# Patient Record
Sex: FEMALE | Race: WHITE | NOT HISPANIC OR LATINO | Employment: UNEMPLOYED | URBAN - NONMETROPOLITAN AREA
[De-identification: names, ages, dates, MRNs, and addresses within clinical notes are randomized per-mention and may not be internally consistent; named-entity substitution may affect disease eponyms.]

---

## 2017-01-05 ENCOUNTER — OFFICE VISIT (OUTPATIENT)
Dept: PAIN MEDICINE | Facility: CLINIC | Age: 57
End: 2017-01-05

## 2017-01-05 VITALS
SYSTOLIC BLOOD PRESSURE: 124 MMHG | BODY MASS INDEX: 20.58 KG/M2 | DIASTOLIC BLOOD PRESSURE: 68 MMHG | WEIGHT: 109 LBS | HEIGHT: 61 IN

## 2017-01-05 DIAGNOSIS — Z79.899 HIGH RISK MEDICATIONS (NOT ANTICOAGULANTS) LONG-TERM USE: ICD-10-CM

## 2017-01-05 DIAGNOSIS — M51.36 DDD (DEGENERATIVE DISC DISEASE), LUMBAR: Primary | ICD-10-CM

## 2017-01-05 DIAGNOSIS — M47.817 LUMBOSACRAL SPONDYLOSIS WITHOUT MYELOPATHY: ICD-10-CM

## 2017-01-05 PROCEDURE — 99214 OFFICE O/P EST MOD 30 MIN: CPT | Performed by: PAIN MEDICINE

## 2017-01-05 RX ORDER — OXYCODONE AND ACETAMINOPHEN 10; 325 MG/1; MG/1
1 TABLET ORAL 4 TIMES DAILY
Qty: 120 TABLET | Refills: 0 | Status: SHIPPED | OUTPATIENT
Start: 2017-01-05 | End: 2017-02-04

## 2017-01-05 NOTE — PROGRESS NOTES
"Hollie Pedro is a 56 y.o. female.   1960    HPI:   Location: lower back and bilateral hip  Quality: sharp  Severity: 7/10  Timing: constant  Alleviating: pain medication  Aggravating: increased activity     Pt had skin lesion removed from ear in interim.  Continues opioid medication for back and hip pains.  Continues to provide enough relief that she remains active with limitation.      The following portions of the patient's history were reviewed by me and updated as appropriate: allergies, current medications, past family history, past medical history, past social history, past surgical history and problem list.    Past Medical History   Diagnosis Date   • Acute bronchitis    • Adenomatous polyp of colon    • Arthropathy of lumbar facet joint    • Asthma    • Benign essential hypertension    • Chronic obstructive lung disease    • Chronic pain    • Degeneration of lumbar intervertebral disc    • Degenerative joint disease involving multiple joints    • Depressive disorder    • Drug therapy      Long-term drug therapy   • Drug therapy      Other long term (current) drug therapy   • Emphysema/COPD      \"Emphysema\"   • Encounter for gynecological examination      Gynecologic examination   • Encounter for gynecological examination without abnormal finding      Encounter for gynecological examination (general) (routine) without abnormal findings   • Encounter for screening for malignant neoplasm of colon      Screening for malignant neoplasm of colon   • Epigastric pain    • Gastroesophageal reflux disease    • Generalized anxiety disorder    • History of bone density study 10/17/2014     DEXA BONE DENSITY 61257 ([facility]) (2) - SAMANTHA MAGANA ([facility])    • History of diagnostic mammography 04/28/2016     DIAG MAMM, UNILAT RT DIG  (Medicare) ([facility]) (1) - ZAYRA JENKINS ([facility])    • History of mammogram      Other screening mammogram   • History of mammogram 10/29/2015     MAMMOGRAM UNILATERAL RT 82523 ([facility]) (1) - B. " "IZZY ([facility])    • History of mammogram 10/26/2015     SCREENING MAMMOGRAM 63014 ([facility]) (4) - B. IZZY ([facility])    • Hyperlipidemia    • Hypertensive disorder    • Left flank pain    • Lumbosacral radiculitis    • Menopause      Menopause - controlled on prempro   • Mild recurrent major depression    • Myofascial pain    • Osteoarthritis    • Osteopenia    • Osteoporosis    • Pain in lower limb    • Paresthesia    • Pleurisy    • Prolapse of vaginal walls without uterine prolapse    • Proximal muscle weakness    • Rash      C/O: a rash - legs, arms, and abdomen.   • Smoker    • Syncope    • Transient cerebral ischemia    • Unexplained weight loss      Unexplained weight loss - No obvious GI reason   • Weakness of face muscles        Social History     Social History   • Marital status: Single     Spouse name: N/A   • Number of children: N/A   • Years of education: N/A     Occupational History   • Not on file.     Social History Main Topics   • Smoking status: Current Every Day Smoker     Types: Cigarettes, Electronic Cigarette   • Smokeless tobacco: Not on file      Comment: Interested in quitting smoking; Amount: 1-9 cigs/day  using the E cigarette. She is smoking 6-8 per day   • Alcohol use No   • Drug use: No   • Sexual activity: Defer      Comment: Marital status: Single     Other Topics Concern   • Not on file     Social History Narrative       Family History   Problem Relation Age of Onset   • Diabetes Mother    • Heart disease Mother    • Hypertension Mother    • Clotting disorder Mother      \"blood clots in lungs and leg after surgery\"   • Lung cancer Father    • Heart disease Sister    • Hypertension Sister    • Heart disease Brother    • Hypertension Brother    • Breast cancer Neg Hx    • Colon cancer Neg Hx      Colorectal cancer   • Endometrial cancer Neg Hx    • Ovarian cancer Neg Hx          Current Outpatient Prescriptions:   •  albuterol (PROVENTIL) (2.5 MG/3ML) 0.083% nebulizer solution, Take 2.5 " mg by nebulization 4 (Four) Times a Day. Use 1 Vial, Disp: 120 mL, Rfl: 5  •  ALPRAZolam (XANAX) 1 MG tablet, Take 1 tablet by mouth 2 (Two) Times a Day. Take 0.5 mg in am and 1 mg at bedtime prn, Disp: 30 tablet, Rfl: 1  •  amitriptyline (ELAVIL) 150 MG tablet, Take 150 mg by mouth every night. at bedtime, Disp: , Rfl:   •  aspirin 81 MG chewable tablet, Chew 81 mg daily., Disp: , Rfl:   •  atorvastatin (LIPITOR) 40 MG tablet, Take 40 mg by mouth daily., Disp: , Rfl:   •  budesonide-formoterol (SYMBICORT) 160-4.5 MCG/ACT inhaler, Inhale 1 puff 2 (Two) Times a Day., Disp: 6 g, Rfl: 5  •  buPROPion SR (WELLBUTRIN SR) 150 MG 12 hr tablet, Take 150 mg by mouth 2 (Two) Times a Day., Disp: , Rfl:   •  Calcium Carbonate-Vit D-Min (CALTRATE PLUS PO), Take 1 tablet by mouth 2 (two) times a day., Disp: , Rfl:   •  cetirizine (zyrTEC) 10 MG tablet, , Disp: , Rfl: 3  •  diltiazem La (CARDIZEM LA) 420 MG 24 hr tablet, 420 mg daily. PER DR. MARTINEZ; Med Name: diltiazem  mg tablet,extended release 24 hr, Disp: , Rfl:   •  Docusate Calcium (STOOL SOFTENER PO), Take 1 tablet by mouth as needed., Disp: , Rfl:   •  fluticasone (FLONASE) 50 MCG/ACT nasal spray, 1-2 sprays into each nostril Daily., Disp: 1 each, Rfl: 5  •  furosemide (LASIX) 20 MG tablet, Take 1 tablet by mouth Daily., Disp: 30 tablet, Rfl: 5  •  ipratropium (ATROVENT HFA) 17 MCG/ACT inhaler, Inhale 2 puffs 4 (Four) Times a Day As Needed for wheezing., Disp: 12.9 g, Rfl: 5  •  ondansetron (ZOFRAN) 4 MG tablet, Take 4 mg by mouth Every 8 (Eight) Hours As Needed for nausea or vomiting., Disp: , Rfl:   •  oxyCODONE-acetaminophen (PERCOCET)  MG per tablet, Take 1 tablet by mouth Every 6 (Six) Hours As Needed for moderate pain (4-6)., Disp: , Rfl:   •  oxyCODONE-acetaminophen (PERCOCET)  MG per tablet, Take 1 tablet by mouth 4 (Four) Times a Day for 30 days., Disp: 120 tablet, Rfl: 0  •  oxyCODONE-acetaminophen (PERCOCET)  MG per tablet, Take 1 tablet  "by mouth 4 (Four) Times a Day for 30 days., Disp: 120 tablet, Rfl: 0  •  PARoxetine (PAXIL) 20 MG tablet, Take 20 mg by mouth Every Night., Disp: , Rfl:   •  ranitidine (ZANTAC) 300 MG tablet, Take 300 mg by mouth 2 (two) times a day., Disp: , Rfl:   •  valACYclovir (VALTREX) 500 MG tablet, Take 500 mg by mouth 2 (two) times a day., Disp: , Rfl:   •  vitamin D (ERGOCALCIFEROL) 00717 UNITS capsule capsule, Take 1 capsule by mouth Every 14 (Fourteen) Days., Disp: 2 capsule, Rfl: 5    Allergies   Allergen Reactions   • Ditropan Xl [Oxybutynin] Other (See Comments)     Other reaction(s): YEAST   • Gabapentin    • Ibuprofen Nausea And Vomiting   • Naprosyn [Naproxen] Nausea And Vomiting   • Phenergan [Promethazine] Hallucinations   • Plavix [Clopidogrel]    • Prednisone Hives   • Prilosec [Omeprazole] Nausea And Vomiting   • Tylenol [Acetaminophen] Nausea And Vomiting     Tylenol-Codeine #3   • Valium [Diazepam] Other (See Comments)     Other reaction(s): \"winds me up\"   • Carafate [Sucralfate] Rash   • Celebrex [Celecoxib] Rash   • Cephalexin Rash   • Doxycycline Monohydrate [Doxycycline] Rash and Other (See Comments)     Other reaction(s): SICK   • Other      Cipro:  Rash  Darvocet-N 100:  N/V   • Relafen [Nabumetone] Nausea And Vomiting and Rash         Review of Systems   Musculoskeletal: Back pain: lower.        Bilateral hip     10 system review of systems was reviewed and negative except for above.    Physical Exam   Constitutional: She appears well-developed and well-nourished. No distress.   Musculoskeletal:        Lumbar back: She exhibits decreased range of motion (min ext with facet loading ) and pain.   Neurological: She is alert.   Walker for ambulation   Psychiatric: She has a normal mood and affect. Her behavior is normal. Judgment normal.       Hollie was seen today for back pain and hip pain.    Diagnoses and all orders for this visit:    DDD (degenerative disc disease), lumbar    Lumbosacral spondylosis " without myelopathy    High risk medications (not anticoagulants) long-term use    Other orders  -     oxyCODONE-acetaminophen (PERCOCET)  MG per tablet; Take 1 tablet by mouth 4 (Four) Times a Day for 30 days.  -     oxyCODONE-acetaminophen (PERCOCET)  MG per tablet; Take 1 tablet by mouth 4 (Four) Times a Day for 30 days.        Medication: Patient reports no negative side effects, Patient reports appropriate usage and storage habits, Patient's opioid provides enough reflief to be more active and perform activities of daily living with less discomfort. and Refill opioid medication as above    Interventional: none at this time    Rehab: none at this time    Behavioral: No aberrant behavior noted. [facility] Report #[id number]  was reviewed and is consistent with stated history    Urine drug screen Reviewed from last visit and is inappropriate.  Soma present.  States she has a prescription for this she takes only when she has significant spasms.  This is from an orthopedist.  I let her know not to take any more soma.          This document has been electronically signed by Kyle Her MD on January 5, 2017 10:46 AM

## 2017-01-09 ENCOUNTER — TELEPHONE (OUTPATIENT)
Dept: FAMILY MEDICINE CLINIC | Facility: CLINIC | Age: 57
End: 2017-01-09

## 2017-01-09 RX ORDER — PAROXETINE HYDROCHLORIDE 20 MG/1
20 TABLET, FILM COATED ORAL NIGHTLY
Qty: 30 TABLET | Refills: 5 | Status: SHIPPED | OUTPATIENT
Start: 2017-01-09 | End: 2017-02-27

## 2017-01-09 RX ORDER — AMITRIPTYLINE HYDROCHLORIDE 150 MG/1
150 TABLET, FILM COATED ORAL NIGHTLY
Qty: 30 TABLET | Refills: 5 | Status: SHIPPED | OUTPATIENT
Start: 2017-01-09 | End: 2017-02-27

## 2017-01-09 RX ORDER — RANITIDINE 300 MG/1
300 TABLET ORAL 2 TIMES DAILY
Qty: 60 TABLET | Refills: 5 | Status: SHIPPED | OUTPATIENT
Start: 2017-01-09 | End: 2017-04-11 | Stop reason: SDUPTHER

## 2017-01-09 NOTE — TELEPHONE ENCOUNTER
----- Message from Florence Farah sent at 1/9/2017  2:59 PM CST -----  PATIENT NEEDS REFILL ON  PAXIL  AMITRIPTYLINE  ZANTAC   SENT TO CLINIC PHARM

## 2017-01-20 DIAGNOSIS — Z12.31 VISIT FOR SCREENING MAMMOGRAM: Primary | ICD-10-CM

## 2017-02-27 ENCOUNTER — APPOINTMENT (OUTPATIENT)
Dept: LAB | Facility: HOSPITAL | Age: 57
End: 2017-02-27

## 2017-02-27 ENCOUNTER — OFFICE VISIT (OUTPATIENT)
Dept: PAIN MEDICINE | Facility: CLINIC | Age: 57
End: 2017-02-27

## 2017-02-27 VITALS
DIASTOLIC BLOOD PRESSURE: 80 MMHG | HEIGHT: 60 IN | SYSTOLIC BLOOD PRESSURE: 140 MMHG | BODY MASS INDEX: 21.85 KG/M2 | WEIGHT: 111.3 LBS

## 2017-02-27 DIAGNOSIS — M51.36 DDD (DEGENERATIVE DISC DISEASE), LUMBAR: Primary | ICD-10-CM

## 2017-02-27 DIAGNOSIS — M47.817 LUMBOSACRAL SPONDYLOSIS WITHOUT MYELOPATHY: ICD-10-CM

## 2017-02-27 DIAGNOSIS — Z79.899 HIGH RISK MEDICATIONS (NOT ANTICOAGULANTS) LONG-TERM USE: ICD-10-CM

## 2017-02-27 PROCEDURE — 80307 DRUG TEST PRSMV CHEM ANLYZR: CPT | Performed by: PAIN MEDICINE

## 2017-02-27 PROCEDURE — 99214 OFFICE O/P EST MOD 30 MIN: CPT | Performed by: NURSE PRACTITIONER

## 2017-02-27 PROCEDURE — G0481 DRUG TEST DEF 8-14 CLASSES: HCPCS | Performed by: PAIN MEDICINE

## 2017-02-27 RX ORDER — BENZONATATE 100 MG/1
CAPSULE ORAL
COMMUNITY
Start: 2016-11-28 | End: 2018-03-15

## 2017-02-27 RX ORDER — OXYCODONE AND ACETAMINOPHEN 10; 325 MG/1; MG/1
1 TABLET ORAL 4 TIMES DAILY
Qty: 120 TABLET | Refills: 0 | Status: SHIPPED | OUTPATIENT
Start: 2017-02-27 | End: 2017-03-29

## 2017-02-27 RX ORDER — AMITRIPTYLINE HYDROCHLORIDE 150 MG/1
150 TABLET, FILM COATED ORAL
COMMUNITY
End: 2017-02-27

## 2017-02-27 RX ORDER — OXYCODONE AND ACETAMINOPHEN 10; 325 MG/1; MG/1
1 TABLET ORAL 4 TIMES DAILY
COMMUNITY

## 2017-02-27 RX ORDER — NITROGLYCERIN 0.4 MG/1
0.4 TABLET SUBLINGUAL
COMMUNITY
Start: 2016-11-04 | End: 2018-06-12

## 2017-02-27 RX ORDER — FLUTICASONE PROPIONATE 50 MCG
1 SPRAY, SUSPENSION (ML) NASAL
COMMUNITY
End: 2017-02-27

## 2017-02-27 RX ORDER — VALACYCLOVIR HYDROCHLORIDE 500 MG/1
500 TABLET, FILM COATED ORAL
COMMUNITY
End: 2017-02-27

## 2017-02-27 RX ORDER — PAROXETINE HYDROCHLORIDE 20 MG/1
20 TABLET, FILM COATED ORAL
COMMUNITY
End: 2017-04-11 | Stop reason: SDUPTHER

## 2017-02-27 RX ORDER — BUPROPION HYDROCHLORIDE 150 MG/1
150 TABLET, EXTENDED RELEASE ORAL DAILY
COMMUNITY
Start: 2016-10-27

## 2017-02-27 RX ORDER — ALPRAZOLAM 2 MG/1
2 TABLET ORAL
COMMUNITY
End: 2017-04-11 | Stop reason: ALTCHOICE

## 2017-02-27 RX ORDER — RANITIDINE 300 MG/1
300 TABLET ORAL
COMMUNITY
End: 2017-04-11 | Stop reason: SDUPTHER

## 2017-02-27 RX ORDER — ONDANSETRON 4 MG/1
4 TABLET, FILM COATED ORAL
COMMUNITY
End: 2017-02-27

## 2017-02-27 RX ORDER — OLANZAPINE 5 MG/1
TABLET ORAL
Refills: 5 | COMMUNITY
Start: 2017-02-06 | End: 2017-02-27

## 2017-02-27 RX ORDER — ATORVASTATIN CALCIUM 40 MG/1
40 TABLET, FILM COATED ORAL
COMMUNITY
Start: 2016-11-04 | End: 2017-02-27

## 2017-02-27 RX ORDER — DILTIAZEM HYDROCHLORIDE 420 MG/1
CAPSULE, EXTENDED RELEASE ORAL
Refills: 11 | COMMUNITY
Start: 2017-02-06 | End: 2017-04-11 | Stop reason: SDUPTHER

## 2017-02-27 NOTE — PROGRESS NOTES
"Hollie Pedro is a 57 y.o. female.   1960    HPI:   Location: lower back and bilateral hip  Quality: sharp  Severity: 7/10  Timing: constant  Alleviating: pain medication  Aggravating: increased activity     Patient denies side effects, she reports that her opioid medication still provides significant relief and allows her to be more active.  No changes in health status.       The following portions of the patient's history were reviewed by me and updated as appropriate: allergies, current medications, past family history, past medical history, past social history, past surgical history and problem list.    Past Medical History   Diagnosis Date   • Acute bronchitis    • Adenomatous polyp of colon    • Arthropathy of lumbar facet joint    • Asthma    • Benign essential hypertension    • Chronic obstructive lung disease    • Chronic pain    • Degeneration of lumbar intervertebral disc    • Degenerative joint disease involving multiple joints    • Depressive disorder    • Drug therapy      Long-term drug therapy   • Drug therapy      Other long term (current) drug therapy   • Emphysema/COPD      \"Emphysema\"   • Encounter for gynecological examination      Gynecologic examination   • Encounter for gynecological examination without abnormal finding      Encounter for gynecological examination (general) (routine) without abnormal findings   • Encounter for screening for malignant neoplasm of colon      Screening for malignant neoplasm of colon   • Epigastric pain    • Gastroesophageal reflux disease    • Generalized anxiety disorder    • History of bone density study 10/17/2014     DEXA BONE DENSITY 28506 ([facility]) (2) - SAMANTHA MAGANA ([facility])    • History of diagnostic mammography 04/28/2016     DIAG MAMM, UNILAT RT DIG  (Medicare) ([facility]) (1) - ZAYRA JENKINS ([facility]1)    • History of mammogram      Other screening mammogram   • History of mammogram 10/29/2015     MAMMOGRAM UNILATERAL RT 01378 ([facility]) (1) - SAMANTHA MAGANA ([facility])  " "  • History of mammogram 10/26/2015     SCREENING MAMMOGRAM 22448 ([facility]) (4) - SAMANTHA MAGANA ([facility])    • Hyperlipidemia    • Hypertensive disorder    • Left flank pain    • Lumbosacral radiculitis    • Menopause      Menopause - controlled on prempro   • Mild recurrent major depression    • Myofascial pain    • Osteoarthritis    • Osteopenia    • Osteoporosis    • Pain in lower limb    • Paresthesia    • Pleurisy    • Prolapse of vaginal walls without uterine prolapse    • Proximal muscle weakness    • Rash      C/O: a rash - legs, arms, and abdomen.   • Smoker    • Syncope    • Transient cerebral ischemia    • Unexplained weight loss      Unexplained weight loss - No obvious GI reason   • Weakness of face muscles        Social History     Social History   • Marital status: Single     Spouse name: N/A   • Number of children: N/A   • Years of education: N/A     Occupational History   • Not on file.     Social History Main Topics   • Smoking status: Current Every Day Smoker     Types: Cigarettes, Electronic Cigarette   • Smokeless tobacco: Not on file      Comment: Interested in quitting smoking; Amount: 1-9 cigs/day  using the E cigarette. She is smoking 6-8 per day   • Alcohol use No   • Drug use: No   • Sexual activity: Defer      Comment: Marital status: Single     Other Topics Concern   • Not on file     Social History Narrative       Family History   Problem Relation Age of Onset   • Diabetes Mother    • Heart disease Mother    • Hypertension Mother    • Clotting disorder Mother      \"blood clots in lungs and leg after surgery\"   • Lung cancer Father    • Heart disease Sister    • Hypertension Sister    • Heart disease Brother    • Hypertension Brother    • Breast cancer Neg Hx    • Colon cancer Neg Hx      Colorectal cancer   • Endometrial cancer Neg Hx    • Ovarian cancer Neg Hx          Current Outpatient Prescriptions:   •  albuterol (PROVENTIL) (2.5 MG/3ML) 0.083% nebulizer solution, Take 2.5 mg by " nebulization 4 (Four) Times a Day. Use 1 Vial, Disp: 120 mL, Rfl: 5  •  ALPRAZolam (XANAX) 1 MG tablet, Take 1 tablet by mouth 2 (Two) Times a Day. Take 0.5 mg in am and 1 mg at bedtime prn, Disp: 30 tablet, Rfl: 1  •  aspirin 81 MG chewable tablet, Chew 81 mg daily., Disp: , Rfl:   •  atorvastatin (LIPITOR) 40 MG tablet, Take 40 mg by mouth daily., Disp: , Rfl:   •  budesonide-formoterol (SYMBICORT) 160-4.5 MCG/ACT inhaler, Inhale 1 puff 2 (Two) Times a Day., Disp: 6 g, Rfl: 5  •  buPROPion SR (WELLBUTRIN SR) 150 MG 12 hr tablet, Take 1 tablet by mouth., Disp: , Rfl:   •  Calcium Carbonate-Vit D-Min (CALTRATE PLUS PO), Take 1 tablet by mouth 2 (two) times a day., Disp: , Rfl:   •  cetirizine (zyrTEC) 10 MG tablet, , Disp: , Rfl: 3  •  diltiazem La (CARDIZEM LA) 420 MG 24 hr tablet, 420 mg daily. PER DR. MARTINEZ; Med Name: diltiazem  mg tablet,extended release 24 hr, Disp: , Rfl:   •  Docusate Calcium (STOOL SOFTENER PO), Take 1 tablet by mouth as needed., Disp: , Rfl:   •  fluticasone (FLONASE) 50 MCG/ACT nasal spray, 1-2 sprays into each nostril Daily., Disp: 1 each, Rfl: 5  •  furosemide (LASIX) 20 MG tablet, Take 1 tablet by mouth Daily., Disp: 30 tablet, Rfl: 5  •  ipratropium (ATROVENT HFA) 17 MCG/ACT inhaler, Inhale 2 puffs 4 (Four) Times a Day As Needed for wheezing., Disp: 12.9 g, Rfl: 5  •  nitroglycerin (NITROSTAT) 0.4 MG SL tablet, Place 0.4 mg under the tongue Every 5 (Five) Minutes., Disp: , Rfl:   •  ondansetron (ZOFRAN) 4 MG tablet, Take 4 mg by mouth Every 8 (Eight) Hours As Needed for nausea or vomiting., Disp: , Rfl:   •  oxyCODONE-acetaminophen (PERCOCET)  MG per tablet, Take 1 tablet by mouth Every 6 (Six) Hours As Needed for moderate pain (4-6)., Disp: , Rfl:   •  raNITIdine (ZANTAC) 300 MG tablet, Take 1 tablet by mouth 2 (Two) Times a Day., Disp: 60 tablet, Rfl: 5  •  valACYclovir (VALTREX) 500 MG tablet, Take 500 mg by mouth 2 (two) times a day., Disp: , Rfl:   •  vitamin D  "(ERGOCALCIFEROL) 93219 UNITS capsule capsule, Take 1 capsule by mouth Every 14 (Fourteen) Days., Disp: 2 capsule, Rfl: 5  •  ALPRAZolam (XANAX) 2 MG tablet, Take 2 mg by mouth., Disp: , Rfl:   •  benzonatate (TESSALON) 100 MG capsule, , Disp: , Rfl:   •  Cholecalciferol 5000 UNITS tablet, Take 5,000 Units by mouth., Disp: , Rfl:   •  diltiaZEM (TIAZAC) 420 MG 24 hr capsule, , Disp: , Rfl: 11  •  fluticasone-salmeterol (ADVAIR DISKUS) 250-50 MCG/DOSE DISKUS, Inhale 1 puff Every 12 (Twelve) Hours., Disp: , Rfl:   •  ipratropium (ATROVENT HFA) 17 MCG/ACT inhaler, Inhale 2 puffs 4 (Four) Times a Day., Disp: , Rfl:   •  oxyCODONE-acetaminophen (PERCOCET)  MG per tablet, Take 1 tablet by mouth 4 (Four) Times a Day., Disp: , Rfl:   •  oxyCODONE-acetaminophen (PERCOCET)  MG per tablet, Take 1 tablet by mouth 4 (Four) Times a Day for 30 days., Disp: 120 tablet, Rfl: 0  •  oxyCODONE-acetaminophen (PERCOCET)  MG per tablet, Take 1 tablet by mouth 4 (Four) Times a Day for 30 days., Disp: 120 tablet, Rfl: 0  •  PARoxetine (PAXIL) 20 MG tablet, Take 20 mg by mouth., Disp: , Rfl:   •  raNITIdine (ZANTAC) 300 MG tablet, Take 300 mg by mouth., Disp: , Rfl:     Allergies   Allergen Reactions   • Ditropan Xl [Oxybutynin] Other (See Comments)     Other reaction(s): YEAST   • Gabapentin    • Ibuprofen Nausea And Vomiting   • Naprosyn [Naproxen] Nausea And Vomiting   • Olanzapine    • Phenergan [Promethazine] Hallucinations   • Plavix [Clopidogrel]    • Prednisone Hives   • Prilosec [Omeprazole] Nausea And Vomiting   • Tylenol [Acetaminophen] Nausea And Vomiting     Tylenol-Codeine #3   • Valium [Diazepam] Other (See Comments)     Other reaction(s): \"winds me up\"   • Carafate [Sucralfate] Rash   • Celebrex [Celecoxib] Rash   • Cephalexin Rash   • Doxycycline Monohydrate [Doxycycline] Rash and Other (See Comments)     Other reaction(s): SICK   • Other      Cipro:  Rash  Darvocet-N 100:  N/V   • Relafen [Nabumetone] " Nausea And Vomiting and Rash         Review of Systems  10 system review of systems was reviewed and negative except for above.    Physical Exam   Constitutional: She appears well-developed and well-nourished. No distress.   Musculoskeletal:        Lumbar back: She exhibits decreased range of motion (min ext with facet loading .  Tested while seated) and pain.   Neurological: She is alert.   Walker for ambulation   Psychiatric: She has a normal mood and affect. Her behavior is normal. Judgment normal.       Hollie was seen today for back pain and pain.    Diagnoses and all orders for this visit:    DDD (degenerative disc disease), lumbar  -     ToxASSURE Select 13 (MW)    Lumbosacral spondylosis without myelopathy  -     ToxASSURE Select 13 (MW)    High risk medications (not anticoagulants) long-term use  -     ToxASSURE Select 13 (MW)    Other orders  -     oxyCODONE-acetaminophen (PERCOCET)  MG per tablet; Take 1 tablet by mouth 4 (Four) Times a Day for 30 days.  -     oxyCODONE-acetaminophen (PERCOCET)  MG per tablet; Take 1 tablet by mouth 4 (Four) Times a Day for 30 days.        Medication: Patient reports no negative side effects, Patient reports appropriate usage and storage habits, Patient's opioid provides enough reflief to be more active and perform activities of daily living with less discomfort. and Refill opioid medication as above    Interventional: none at this time    Rehab: none at this time    Behavioral: No aberrant behavior noted. [facility] Report #[id number]  was reviewed and is consistent with stated history    Urine drug screen Ordered today to test for drugs of abuse and prescribed medications          This document has been electronically signed by Kyle Her MD on February 27, 2017 9:35 AM

## 2017-03-03 LAB
CONV REPORT SUMMARY: NORMAL
Lab: NORMAL

## 2017-03-15 ENCOUNTER — LAB (OUTPATIENT)
Dept: LAB | Facility: OTHER | Age: 57
End: 2017-03-15

## 2017-03-15 DIAGNOSIS — R74.8 ELEVATED LIVER ENZYMES: ICD-10-CM

## 2017-03-15 DIAGNOSIS — J44.9 CHRONIC OBSTRUCTIVE PULMONARY DISEASE, UNSPECIFIED COPD TYPE (HCC): ICD-10-CM

## 2017-03-15 LAB
ALBUMIN SERPL-MCNC: 4.4 G/DL (ref 3.2–5.5)
ALBUMIN/GLOB SERPL: 1.5 G/DL (ref 1–3)
ALP SERPL-CCNC: 167 U/L (ref 15–121)
ALT SERPL W P-5'-P-CCNC: 28 U/L (ref 10–60)
ANION GAP SERPL CALCULATED.3IONS-SCNC: 12 MMOL/L (ref 5–15)
AST SERPL-CCNC: 34 U/L (ref 10–60)
BILIRUB CONJ SERPL-MCNC: <0.1 MG/DL (ref 0–0.1)
BILIRUB SERPL-MCNC: 0.7 MG/DL (ref 0.2–1)
BUN BLD-MCNC: 6 MG/DL (ref 8–25)
BUN/CREAT SERPL: 6.7 (ref 7–25)
CALCIUM SPEC-SCNC: 10.1 MG/DL (ref 8.4–10.8)
CHLORIDE SERPL-SCNC: 98 MMOL/L (ref 100–112)
CO2 SERPL-SCNC: 30 MMOL/L (ref 20–32)
CREAT BLD-MCNC: 0.9 MG/DL (ref 0.4–1.3)
GFR SERPL CREATININE-BSD FRML MDRD: 64 ML/MIN/1.73 (ref 51–120)
GLOBULIN UR ELPH-MCNC: 3 GM/DL (ref 2.5–4.6)
GLUCOSE BLD-MCNC: 105 MG/DL (ref 70–100)
POTASSIUM BLD-SCNC: 2.7 MMOL/L (ref 3.4–5.4)
PROT SERPL-MCNC: 7.4 G/DL (ref 6.7–8.2)
SODIUM BLD-SCNC: 140 MMOL/L (ref 134–146)

## 2017-03-15 PROCEDURE — 36415 COLL VENOUS BLD VENIPUNCTURE: CPT | Performed by: INTERNAL MEDICINE

## 2017-03-15 PROCEDURE — 80053 COMPREHEN METABOLIC PANEL: CPT | Performed by: INTERNAL MEDICINE

## 2017-03-15 PROCEDURE — 80074 ACUTE HEPATITIS PANEL: CPT | Performed by: INTERNAL MEDICINE

## 2017-03-15 PROCEDURE — 82248 BILIRUBIN DIRECT: CPT | Performed by: INTERNAL MEDICINE

## 2017-03-17 LAB
HAV IGM SERPL QL IA: NEGATIVE
HBV CORE IGM SERPL QL IA: NEGATIVE
HBV SURFACE AG SERPL QL IA: NEGATIVE
HCV AB SER DONR QL: NEGATIVE

## 2017-04-11 ENCOUNTER — OFFICE VISIT (OUTPATIENT)
Dept: FAMILY MEDICINE CLINIC | Facility: CLINIC | Age: 57
End: 2017-04-11

## 2017-04-11 ENCOUNTER — LAB (OUTPATIENT)
Dept: LAB | Facility: OTHER | Age: 57
End: 2017-04-11

## 2017-04-11 VITALS
SYSTOLIC BLOOD PRESSURE: 134 MMHG | HEIGHT: 61 IN | HEART RATE: 115 BPM | OXYGEN SATURATION: 96 % | BODY MASS INDEX: 21.14 KG/M2 | WEIGHT: 112 LBS | DIASTOLIC BLOOD PRESSURE: 68 MMHG

## 2017-04-11 DIAGNOSIS — J44.9 CHRONIC OBSTRUCTIVE PULMONARY DISEASE, UNSPECIFIED COPD TYPE (HCC): Chronic | ICD-10-CM

## 2017-04-11 DIAGNOSIS — M15.9 OSTEOARTHRITIS OF MULTIPLE JOINTS, UNSPECIFIED OSTEOARTHRITIS TYPE: Chronic | ICD-10-CM

## 2017-04-11 DIAGNOSIS — E78.5 HYPERLIPIDEMIA, UNSPECIFIED HYPERLIPIDEMIA TYPE: Chronic | ICD-10-CM

## 2017-04-11 DIAGNOSIS — E87.6 HYPOKALEMIA: ICD-10-CM

## 2017-04-11 DIAGNOSIS — F41.9 ANXIETY: Primary | Chronic | ICD-10-CM

## 2017-04-11 DIAGNOSIS — F32.A DEPRESSION, UNSPECIFIED DEPRESSION TYPE: Chronic | ICD-10-CM

## 2017-04-11 DIAGNOSIS — K21.00 REFLUX ESOPHAGITIS: Chronic | ICD-10-CM

## 2017-04-11 LAB — POTASSIUM BLD-SCNC: 4.1 MMOL/L (ref 3.4–5.4)

## 2017-04-11 PROCEDURE — 84132 ASSAY OF SERUM POTASSIUM: CPT | Performed by: INTERNAL MEDICINE

## 2017-04-11 PROCEDURE — 36415 COLL VENOUS BLD VENIPUNCTURE: CPT | Performed by: INTERNAL MEDICINE

## 2017-04-11 PROCEDURE — 99214 OFFICE O/P EST MOD 30 MIN: CPT | Performed by: INTERNAL MEDICINE

## 2017-04-11 RX ORDER — BUDESONIDE AND FORMOTEROL FUMARATE DIHYDRATE 160; 4.5 UG/1; UG/1
1 AEROSOL RESPIRATORY (INHALATION) 2 TIMES DAILY
Qty: 6 G | Refills: 5 | Status: SHIPPED | OUTPATIENT
Start: 2017-04-11 | End: 2017-04-11 | Stop reason: SDUPTHER

## 2017-04-11 RX ORDER — BUDESONIDE AND FORMOTEROL FUMARATE DIHYDRATE 160; 4.5 UG/1; UG/1
1 AEROSOL RESPIRATORY (INHALATION) 2 TIMES DAILY
Qty: 6 G | Refills: 5 | Status: SHIPPED | OUTPATIENT
Start: 2017-04-11 | End: 2018-03-15

## 2017-04-11 RX ORDER — FLUTICASONE PROPIONATE 50 MCG
1-2 SPRAY, SUSPENSION (ML) NASAL DAILY
Qty: 1 EACH | Refills: 5 | Status: SHIPPED | OUTPATIENT
Start: 2017-04-11 | End: 2017-04-11 | Stop reason: SDUPTHER

## 2017-04-11 RX ORDER — DILTIAZEM HYDROCHLORIDE EXTENDED-RELEASE TABLETS 420 MG/1
420 TABLET, EXTENDED RELEASE ORAL DAILY
Qty: 30 TABLET | Refills: 5 | Status: SHIPPED | OUTPATIENT
Start: 2017-04-11 | End: 2018-05-05 | Stop reason: SDUPTHER

## 2017-04-11 RX ORDER — ALPRAZOLAM 1 MG/1
1 TABLET ORAL 3 TIMES DAILY
COMMUNITY
End: 2017-08-23 | Stop reason: ALTCHOICE

## 2017-04-11 RX ORDER — RANITIDINE 300 MG/1
300 TABLET ORAL 2 TIMES DAILY
Qty: 60 TABLET | Refills: 5 | Status: SHIPPED | OUTPATIENT
Start: 2017-04-11 | End: 2018-01-08 | Stop reason: SDUPTHER

## 2017-04-11 RX ORDER — CETIRIZINE HYDROCHLORIDE 10 MG/1
10 TABLET ORAL DAILY
Qty: 30 TABLET | Refills: 5 | Status: SHIPPED | OUTPATIENT
Start: 2017-04-11

## 2017-04-11 RX ORDER — ERGOCALCIFEROL 1.25 MG/1
50000 CAPSULE ORAL
Qty: 2 CAPSULE | Refills: 5 | Status: SHIPPED | OUTPATIENT
Start: 2017-04-11 | End: 2017-04-11 | Stop reason: SDUPTHER

## 2017-04-11 RX ORDER — ERGOCALCIFEROL 1.25 MG/1
50000 CAPSULE ORAL
Qty: 2 CAPSULE | Refills: 5 | Status: SHIPPED | OUTPATIENT
Start: 2017-04-11 | End: 2018-06-12

## 2017-04-11 RX ORDER — PAROXETINE HYDROCHLORIDE 20 MG/1
20 TABLET, FILM COATED ORAL EVERY MORNING
Qty: 30 TABLET | Refills: 5 | Status: SHIPPED | OUTPATIENT
Start: 2017-04-11 | End: 2018-06-12

## 2017-04-11 RX ORDER — ATORVASTATIN CALCIUM 40 MG/1
40 TABLET, FILM COATED ORAL DAILY
Qty: 30 TABLET | Refills: 5 | Status: SHIPPED | OUTPATIENT
Start: 2017-04-11 | End: 2018-05-05 | Stop reason: SDUPTHER

## 2017-04-11 RX ORDER — FLUTICASONE PROPIONATE 50 MCG
1-2 SPRAY, SUSPENSION (ML) NASAL DAILY
Qty: 1 EACH | Refills: 5 | Status: SHIPPED | OUTPATIENT
Start: 2017-04-11 | End: 2018-06-12

## 2017-04-11 RX ORDER — VALACYCLOVIR HYDROCHLORIDE 500 MG/1
500 TABLET, FILM COATED ORAL 2 TIMES DAILY
Qty: 60 TABLET | Refills: 1 | Status: SHIPPED | OUTPATIENT
Start: 2017-04-11

## 2017-04-11 NOTE — PROGRESS NOTES
Subjective   Hollie Pedro is a 57 y.o. female.     Chief Complaint   Patient presents with   • Follow-up     6 month check up med refill        History of Present Illness     Pt in f/u on anxiety, DJD, hyperlipidemia, depression, reflux, and COPD.    Pt says she is a nervous wreck because she is worried about her lab work.  She admits that she was taken off a chewable calcium and she wants to know what her calcium level looks like.  She says she knows her heart is beating fast today but only because she is so nervous.  Pt is otherwise doing okay and tolerating her medication and other conditions well.     The following portions of the patient's history were reviewed and updated as appropriate: allergies, current medications, past family history, past medical history, past social history, past surgical history and problem list.    Review of Systems   Constitutional: Negative for activity change, appetite change, fatigue and unexpected weight change.   HENT: Negative for ear pain, facial swelling and hearing loss.    Respiratory: Negative for cough, chest tightness, shortness of breath and wheezing.    Cardiovascular: Negative for chest pain, palpitations and leg swelling.   Gastrointestinal: Negative for abdominal pain.   Genitourinary: Negative for difficulty urinating, dysuria, flank pain, frequency and urgency.   Musculoskeletal: Positive for arthralgias.   Skin: Negative for color change and rash.   Allergic/Immunologic: Negative for environmental allergies and food allergies.   Neurological: Negative for dizziness, syncope, weakness, numbness and headaches.   Hematological: Negative for adenopathy.   Psychiatric/Behavioral: Negative for confusion, decreased concentration, dysphoric mood, sleep disturbance and suicidal ideas. The patient is nervous/anxious.    All other systems reviewed and are negative.      Objective   Physical Exam   Constitutional: She is oriented to person, place, and time. Vital signs  are normal. She appears well-developed and well-nourished.   HENT:   Head: Normocephalic.   Right Ear: External ear normal.   Left Ear: External ear normal.   Nose: Nose normal.   Mouth/Throat: Oropharynx is clear and moist.   Eyes: Conjunctivae and EOM are normal. Pupils are equal, round, and reactive to light.   Neck: Normal range of motion. Neck supple. No JVD present. No thyromegaly present.   Cardiovascular: Normal rate, regular rhythm, normal heart sounds and intact distal pulses.    No murmur heard.  Pulmonary/Chest: Effort normal and breath sounds normal. No respiratory distress. She has no wheezes. She has no rales. She exhibits no tenderness.   Abdominal: Soft. Bowel sounds are normal. She exhibits no distension and no mass. There is no tenderness. There is no rebound and no guarding. No hernia.   Musculoskeletal: Normal range of motion. She exhibits no edema or deformity.   Lymphadenopathy:     She has no cervical adenopathy.   Neurological: She is alert and oriented to person, place, and time. No cranial nerve deficit. Coordination normal.   Skin: Skin is warm and dry. No rash noted. No pallor.   Psychiatric: Her behavior is normal. Judgment and thought content normal. Her mood appears anxious. She does not exhibit a depressed mood. She expresses no homicidal and no suicidal ideation. She expresses no suicidal plans and no homicidal plans.   Nursing note and vitals reviewed.      Assessment/Plan   Hollie was seen today for follow-up.    Diagnoses and all orders for this visit:    Anxiety    Osteoarthritis of multiple joints, unspecified osteoarthritis type    Hyperlipidemia, unspecified hyperlipidemia type    Depression, unspecified depression type    Reflux esophagitis    Chronic obstructive pulmonary disease, unspecified COPD type    Hypokalemia  Comments:  New  Orders:  -     Potassium; Future    Other orders  -     atorvastatin (LIPITOR) 40 MG tablet; Take 1 tablet by mouth Daily.  -     Discontinue:  budesonide-formoterol (SYMBICORT) 160-4.5 MCG/ACT inhaler; Inhale 1 puff 2 (Two) Times a Day.  -     cetirizine (zyrTEC) 10 MG tablet; Take 1 tablet by mouth Daily.  -     Cholecalciferol 5000 UNITS tablet; Take 5,000 Units by mouth Daily.  -     diltiazem La (CARDIZEM LA) 420 MG 24 hr tablet; Take 1 tablet by mouth Daily. PER DR. MARTINEZ; Med Name: diltiazem  mg tablet,extended release 24 hr  -     Discontinue: fluticasone (FLONASE) 50 MCG/ACT nasal spray; 1-2 sprays into each nostril Daily.  -     fluticasone-salmeterol (ADVAIR DISKUS) 250-50 MCG/DOSE DISKUS; Inhale 1 puff Every 12 (Twelve) Hours.  -     ipratropium (ATROVENT HFA) 17 MCG/ACT inhaler; Inhale 2 puffs 4 (Four) Times a Day.  -     PARoxetine (PAXIL) 20 MG tablet; Take 1 tablet by mouth Every Morning.  -     raNITIdine (ZANTAC) 300 MG tablet; Take 1 tablet by mouth 2 (Two) Times a Day.  -     valACYclovir (VALTREX) 500 MG tablet; Take 1 tablet by mouth 2 (Two) Times a Day.  -     Discontinue: vitamin D (ERGOCALCIFEROL) 69730 UNITS capsule capsule; Take 1 capsule by mouth Every 14 (Fourteen) Days.  -     fluticasone (FLONASE) 50 MCG/ACT nasal spray; 1-2 sprays into each nostril Daily.  -     budesonide-formoterol (SYMBICORT) 160-4.5 MCG/ACT inhaler; Inhale 1 puff 2 (Two) Times a Day.  -     vitamin D (ERGOCALCIFEROL) 91521 UNITS capsule capsule; Take 1 capsule by mouth Every 14 (Fourteen) Days.         STAT on the way out    Potassium level    Scrib[facility] for Dr. Falcon by [facility] 04[facility]11/17      Reviewed labs with patient from 3-15-17  Glucose 105  Creatinine 0.90  BUN 6  Sodium 140  Potassium 2.7  Calcium 10.1  Hemoglobin 13.7   Hematocrit  40.6  Platelets   323  Hepatitis screen is negative    U/S of Abdomen from 12-22-16 shows cholecystectomy.  Atherosclerotic vascular abdominal aortic disease.  No evidence of acute intra abdominal disease.    Recommended to the patient to quit smoking.  Explained risks of continuing, and  benefits of quitting.       Refill medications if due    Patient refills Xanax with Kadie Monroe.  Patient refills Percocet with Kyle Her    Scribed for Dr. Falcon by Lory Zendejas [facility] 04/11/17

## 2017-04-27 ENCOUNTER — OFFICE VISIT (OUTPATIENT)
Dept: PAIN MEDICINE | Facility: CLINIC | Age: 57
End: 2017-04-27

## 2017-04-27 VITALS
HEIGHT: 61 IN | WEIGHT: 111.7 LBS | BODY MASS INDEX: 21.09 KG/M2 | SYSTOLIC BLOOD PRESSURE: 130 MMHG | DIASTOLIC BLOOD PRESSURE: 68 MMHG

## 2017-04-27 DIAGNOSIS — Z79.899 HIGH RISK MEDICATIONS (NOT ANTICOAGULANTS) LONG-TERM USE: ICD-10-CM

## 2017-04-27 DIAGNOSIS — M47.817 LUMBOSACRAL SPONDYLOSIS WITHOUT MYELOPATHY: ICD-10-CM

## 2017-04-27 DIAGNOSIS — M51.36 DDD (DEGENERATIVE DISC DISEASE), LUMBAR: Primary | ICD-10-CM

## 2017-04-27 PROCEDURE — 99214 OFFICE O/P EST MOD 30 MIN: CPT | Performed by: PAIN MEDICINE

## 2017-04-27 RX ORDER — OXYCODONE AND ACETAMINOPHEN 10; 325 MG/1; MG/1
1 TABLET ORAL 4 TIMES DAILY
Qty: 120 TABLET | Refills: 0 | Status: SHIPPED | OUTPATIENT
Start: 2017-04-27 | End: 2017-05-27

## 2017-04-27 RX ORDER — AMITRIPTYLINE HYDROCHLORIDE 150 MG/1
150 TABLET, FILM COATED ORAL NIGHTLY
COMMUNITY

## 2017-04-27 RX ORDER — ASPIRIN 81 MG/1
TABLET, COATED ORAL
Refills: 12 | COMMUNITY
Start: 2017-04-20 | End: 2017-12-15 | Stop reason: SDUPTHER

## 2017-04-27 NOTE — PROGRESS NOTES
"Hollie Pedro is a 57 y.o. female.   1960    HPI:   Location: lower back and bilateral hip  Quality: sharp  Severity: 6-7/10  Timing: constant  Alleviating: pain medication  Aggravating: increased activity     Patient reports that the opioid medication still provides her good relief and allows increased activity than she would have without the opioid medication.  She denies side effects.          The following portions of the patient's history were reviewed by me and updated as appropriate: allergies, current medications, past family history, past medical history, past social history, past surgical history and problem list.    Past Medical History:   Diagnosis Date   • Acute bronchitis    • Adenomatous polyp of colon    • Arthropathy of lumbar facet joint    • Asthma    • Benign essential hypertension    • Chronic obstructive lung disease    • Chronic pain    • Degeneration of lumbar intervertebral disc    • Degenerative joint disease involving multiple joints    • Depressive disorder    • Drug therapy     Long-term drug therapy   • Drug therapy     Other long term (current) drug therapy   • Emphysema/COPD     \"Emphysema\"   • Encounter for gynecological examination     Gynecologic examination   • Encounter for gynecological examination without abnormal finding     Encounter for gynecological examination (general) (routine) without abnormal findings   • Encounter for screening for malignant neoplasm of colon     Screening for malignant neoplasm of colon   • Epigastric pain    • Gastroesophageal reflux disease    • Generalized anxiety disorder    • History of bone density study 10/17/2014    DEXA BONE DENSITY 71640 ([facility]) (2) - SAMANTHA MAGANA ([facility])    • History of diagnostic mammography 04/28/2016    DIAG MAMM, UNILAT RT DIG  (Medicare) ([facility]) (1) - ZAYRA JENKINS ([facility])    • History of mammogram     Other screening mammogram   • History of mammogram 10/29/2015    MAMMOGRAM UNILATERAL RT 27283 ([facility]) (1) - B. " "IZZY ([facility])    • History of mammogram 10/26/2015    SCREENING MAMMOGRAM 72195 ([facility]) (4) - B. IZZY ([facility])    • Hyperlipidemia    • Hypertensive disorder    • Left flank pain    • Lumbosacral radiculitis    • Menopause     Menopause - controlled on prempro   • Mild recurrent major depression    • Myofascial pain    • Osteoarthritis    • Osteopenia    • Osteoporosis    • Pain in lower limb    • Paresthesia    • Pleurisy    • Prolapse of vaginal walls without uterine prolapse    • Proximal muscle weakness    • Rash     C/O: a rash - legs, arms, and abdomen.   • Smoker    • Syncope    • Transient cerebral ischemia    • Unexplained weight loss     Unexplained weight loss - No obvious GI reason   • Weakness of face muscles        Social History     Social History   • Marital status: Single     Spouse name: N/A   • Number of children: N/A   • Years of education: N/A     Occupational History   • Not on file.     Social History Main Topics   • Smoking status: Current Every Day Smoker     Types: Cigarettes, Electronic Cigarette   • Smokeless tobacco: Not on file      Comment: Interested in quitting smoking; Amount: 1-9 cigs/day  using the E cigarette. She is smoking 6-8 per day   • Alcohol use No   • Drug use: No   • Sexual activity: Defer      Comment: Marital status: Single     Other Topics Concern   • Not on file     Social History Narrative       Family History   Problem Relation Age of Onset   • Diabetes Mother    • Heart disease Mother    • Hypertension Mother    • Clotting disorder Mother      \"blood clots in lungs and leg after surgery\"   • Lung cancer Father    • Heart disease Sister    • Hypertension Sister    • Heart disease Brother    • Hypertension Brother    • Breast cancer Neg Hx    • Colon cancer Neg Hx      Colorectal cancer   • Endometrial cancer Neg Hx    • Ovarian cancer Neg Hx          Current Outpatient Prescriptions:   •  albuterol (PROVENTIL) (2.5 MG/3ML) 0.083% nebulizer solution, Take 2.5 mg " by nebulization 4 (Four) Times a Day. Use 1 Vial, Disp: 120 mL, Rfl: 5  •  ALPRAZolam (XANAX) 1 MG tablet, Take 1 mg by mouth 3 (Three) Times a Day., Disp: , Rfl:   •  amitriptyline (ELAVIL) 150 MG tablet, Take 150 mg by mouth., Disp: , Rfl:   •  aspirin 81 MG chewable tablet, Chew 81 mg daily., Disp: , Rfl:   •  ASPIRIN LOW DOSE 81 MG EC tablet, , Disp: , Rfl: 12  •  atorvastatin (LIPITOR) 40 MG tablet, Take 1 tablet by mouth Daily., Disp: 30 tablet, Rfl: 5  •  benzonatate (TESSALON) 100 MG capsule, , Disp: , Rfl:   •  budesonide-formoterol (SYMBICORT) 160-4.5 MCG/ACT inhaler, Inhale 1 puff 2 (Two) Times a Day., Disp: 6 g, Rfl: 5  •  buPROPion SR (WELLBUTRIN SR) 150 MG 12 hr tablet, Take 1 tablet by mouth., Disp: , Rfl:   •  Calcium Carbonate-Vit D-Min (CALTRATE PLUS PO), Take 1 tablet by mouth 2 (two) times a day., Disp: , Rfl:   •  cetirizine (zyrTEC) 10 MG tablet, Take 1 tablet by mouth Daily., Disp: 30 tablet, Rfl: 5  •  Cholecalciferol 5000 UNITS tablet, Take 5,000 Units by mouth Daily., Disp: 30 tablet, Rfl: 5  •  diltiazem La (CARDIZEM LA) 420 MG 24 hr tablet, Take 1 tablet by mouth Daily. PER DR. MARTINEZ; Med Name: diltiazem  mg tablet,extended release 24 hr, Disp: 30 tablet, Rfl: 5  •  Docusate Calcium (STOOL SOFTENER PO), Take 1 tablet by mouth as needed., Disp: , Rfl:   •  fluticasone (FLONASE) 50 MCG/ACT nasal spray, 1-2 sprays into each nostril Daily., Disp: 1 each, Rfl: 5  •  fluticasone-salmeterol (ADVAIR DISKUS) 250-50 MCG/DOSE DISKUS, Inhale 1 puff Every 12 (Twelve) Hours., Disp: 60 each, Rfl: 5  •  furosemide (LASIX) 20 MG tablet, Take 1 tablet by mouth Daily., Disp: 30 tablet, Rfl: 5  •  ipratropium (ATROVENT HFA) 17 MCG/ACT inhaler, Inhale 2 puffs 4 (Four) Times a Day., Disp: 1 inhaler, Rfl: 5  •  nitroglycerin (NITROSTAT) 0.4 MG SL tablet, Place 0.4 mg under the tongue Every 5 (Five) Minutes., Disp: , Rfl:   •  ondansetron (ZOFRAN) 4 MG tablet, Take 4 mg by mouth Every 8 (Eight) Hours As  "Needed for nausea or vomiting., Disp: , Rfl:   •  oxyCODONE-acetaminophen (PERCOCET)  MG per tablet, Take 1 tablet by mouth 4 (Four) Times a Day., Disp: , Rfl:   •  PARoxetine (PAXIL) 20 MG tablet, Take 1 tablet by mouth Every Morning., Disp: 30 tablet, Rfl: 5  •  raNITIdine (ZANTAC) 300 MG tablet, Take 1 tablet by mouth 2 (Two) Times a Day., Disp: 60 tablet, Rfl: 5  •  valACYclovir (VALTREX) 500 MG tablet, Take 1 tablet by mouth 2 (Two) Times a Day., Disp: 60 tablet, Rfl: 1  •  vitamin D (ERGOCALCIFEROL) 91806 UNITS capsule capsule, Take 1 capsule by mouth Every 14 (Fourteen) Days., Disp: 2 capsule, Rfl: 5  •  oxyCODONE-acetaminophen (PERCOCET)  MG per tablet, Take 1 tablet by mouth 4 (Four) Times a Day for 30 days., Disp: 120 tablet, Rfl: 0  •  oxyCODONE-acetaminophen (PERCOCET)  MG per tablet, Take 1 tablet by mouth 4 (Four) Times a Day for 30 days., Disp: 120 tablet, Rfl: 0    Allergies   Allergen Reactions   • Ciprofloxacin    • Ditropan Xl [Oxybutynin] Other (See Comments)     Other reaction(s): YEAST   • Gabapentin    • Ibuprofen Nausea And Vomiting   • Naprosyn [Naproxen] Nausea And Vomiting   • Olanzapine    • Phenergan [Promethazine] Hallucinations   • Plavix [Clopidogrel]    • Prednisone Hives   • Prilosec [Omeprazole] Nausea And Vomiting   • Tylenol [Acetaminophen] Nausea And Vomiting     Tylenol-Codeine #3   • Valium [Diazepam] Other (See Comments)     Other reaction(s): \"winds me up\"   • Carafate [Sucralfate] Rash   • Celebrex [Celecoxib] Rash   • Cephalexin Rash   • Doxycycline Monohydrate [Doxycycline] Rash and Other (See Comments)     Other reaction(s): SICK   • Other      Cipro:  Rash  Darvocet-N 100:  N/V   • Relafen [Nabumetone] Nausea And Vomiting and Rash         Review of Systems   Musculoskeletal: Positive for back pain (lower).        Bilateral hip pain     10 system review of systems was reviewed and negative except for above.    Physical Exam   Constitutional: She appears " well-developed and well-nourished. No distress.   Musculoskeletal:        Lumbar back: She exhibits decreased range of motion (ext limited to less than 5 deg. ).   Neurological: She is alert.   Walker for ambulation   Psychiatric: She has a normal mood and affect. Her behavior is normal. Judgment normal.       Hollie was seen today for back pain and hip pain.    Diagnoses and all orders for this visit:    DDD (degenerative disc disease), lumbar    Lumbosacral spondylosis without myelopathy    High risk medications (not anticoagulants) long-term use    Other orders  -     oxyCODONE-acetaminophen (PERCOCET)  MG per tablet; Take 1 tablet by mouth 4 (Four) Times a Day for 30 days.  -     oxyCODONE-acetaminophen (PERCOCET)  MG per tablet; Take 1 tablet by mouth 4 (Four) Times a Day for 30 days.      Medication: Patient reports no negative side effects, Patient reports appropriate usage and storage habits, Patient's opioid provides enough reflief to be more active and perform activities of daily living with less discomfort. and Refill opioid medication as above.  Denies any problems with her opioid medication.  Doing well.    Interventional: none at this time    Rehab: none at this time    Behavioral: No aberrant behavior noted. SHREYA Report #[id number]  was reviewed and is consistent with stated history    Urine drug screen Reviewed from last visit and is appropriate          This document has been electronically signed by Kyle Her MD on April 27, 2017 9:34 AM

## 2017-05-25 ENCOUNTER — TELEPHONE (OUTPATIENT)
Dept: FAMILY MEDICINE CLINIC | Facility: CLINIC | Age: 57
End: 2017-05-25

## 2017-06-09 ENCOUNTER — LAB (OUTPATIENT)
Dept: LAB | Facility: OTHER | Age: 57
End: 2017-06-09

## 2017-06-09 ENCOUNTER — TRANSCRIBE ORDERS (OUTPATIENT)
Dept: GENERAL RADIOLOGY | Facility: CLINIC | Age: 57
End: 2017-06-09

## 2017-06-09 DIAGNOSIS — Z13.220 LIPID SCREENING: Primary | ICD-10-CM

## 2017-06-09 DIAGNOSIS — Z13.1 DIABETES MELLITUS SCREENING: ICD-10-CM

## 2017-06-09 DIAGNOSIS — R53.1 WEAKNESS: ICD-10-CM

## 2017-06-09 DIAGNOSIS — Z79.899 LONG-TERM USE OF HIGH-RISK MEDICATION: ICD-10-CM

## 2017-06-09 DIAGNOSIS — Z13.220 LIPID SCREENING: ICD-10-CM

## 2017-06-09 LAB
25(OH)D3 SERPL-MCNC: 37.9 NG/ML (ref 30–100)
ALBUMIN SERPL-MCNC: 4.4 G/DL (ref 3.2–5.5)
ALBUMIN/GLOB SERPL: 1.5 G/DL (ref 1–3)
ALP SERPL-CCNC: 138 U/L (ref 15–121)
ALT SERPL W P-5'-P-CCNC: 23 U/L (ref 10–60)
ANION GAP SERPL CALCULATED.3IONS-SCNC: 12 MMOL/L (ref 5–15)
AST SERPL-CCNC: 31 U/L (ref 10–60)
BASOPHILS # BLD AUTO: 0.05 10*3/MM3 (ref 0–0.2)
BASOPHILS NFR BLD AUTO: 0.5 % (ref 0–2)
BILIRUB SERPL-MCNC: 0.5 MG/DL (ref 0.2–1)
BUN BLD-MCNC: 9 MG/DL (ref 8–25)
BUN/CREAT SERPL: 10 (ref 7–25)
CALCIUM SPEC-SCNC: 10.1 MG/DL (ref 8.4–10.8)
CHLORIDE SERPL-SCNC: 100 MMOL/L (ref 100–112)
CHOLEST SERPL-MCNC: 153 MG/DL (ref 150–200)
CO2 SERPL-SCNC: 27 MMOL/L (ref 20–32)
CREAT BLD-MCNC: 0.9 MG/DL (ref 0.4–1.3)
DEPRECATED RDW RBC AUTO: 51.8 FL (ref 36.4–46.3)
EOSINOPHIL # BLD AUTO: 0.18 10*3/MM3 (ref 0–0.7)
EOSINOPHIL NFR BLD AUTO: 2 % (ref 0–7)
ERYTHROCYTE [DISTWIDTH] IN BLOOD BY AUTOMATED COUNT: 16 % (ref 11.5–14.5)
GFR SERPL CREATININE-BSD FRML MDRD: 64 ML/MIN/1.73 (ref 51–120)
GLOBULIN UR ELPH-MCNC: 2.9 GM/DL (ref 2.5–4.6)
GLUCOSE BLD-MCNC: 103 MG/DL (ref 70–100)
HBA1C MFR BLD: 5.98 % (ref 4–5.6)
HCT VFR BLD AUTO: 42.1 % (ref 35–45)
HDLC SERPL-MCNC: 76 MG/DL (ref 35–100)
HGB BLD-MCNC: 14.4 G/DL (ref 12–15.5)
LDLC SERPL CALC-MCNC: 68 MG/DL
LDLC/HDLC SERPL: 0.9 {RATIO}
LYMPHOCYTES # BLD AUTO: 2.88 10*3/MM3 (ref 0.6–4.2)
LYMPHOCYTES NFR BLD AUTO: 31.2 % (ref 10–50)
MCH RBC QN AUTO: 31.1 PG (ref 26.5–34)
MCHC RBC AUTO-ENTMCNC: 34.2 G/DL (ref 31.4–36)
MCV RBC AUTO: 90.9 FL (ref 80–98)
MONOCYTES # BLD AUTO: 0.85 10*3/MM3 (ref 0–0.9)
MONOCYTES NFR BLD AUTO: 9.2 % (ref 0–12)
NEUTROPHILS # BLD AUTO: 5.27 10*3/MM3 (ref 2–8.6)
NEUTROPHILS NFR BLD AUTO: 57.1 % (ref 37–80)
PLATELET # BLD AUTO: 344 10*3/MM3 (ref 150–450)
PMV BLD AUTO: 9.9 FL (ref 8–12)
POTASSIUM BLD-SCNC: 4.1 MMOL/L (ref 3.4–5.4)
PROT SERPL-MCNC: 7.3 G/DL (ref 6.7–8.2)
RBC # BLD AUTO: 4.63 10*6/MM3 (ref 3.77–5.16)
SODIUM BLD-SCNC: 139 MMOL/L (ref 134–146)
TRIGL SERPL-MCNC: 43 MG/DL (ref 35–160)
TSH SERPL DL<=0.05 MIU/L-ACNC: 0.47 MIU/ML (ref 0.46–4.68)
VLDLC SERPL-MCNC: 8.6 MG/DL
WBC NRBC COR # BLD: 9.23 10*3/MM3 (ref 3.2–9.8)

## 2017-06-09 PROCEDURE — 84443 ASSAY THYROID STIM HORMONE: CPT | Performed by: INTERNAL MEDICINE

## 2017-06-09 PROCEDURE — 85025 COMPLETE CBC W/AUTO DIFF WBC: CPT | Performed by: INTERNAL MEDICINE

## 2017-06-09 PROCEDURE — 82306 VITAMIN D 25 HYDROXY: CPT | Performed by: INTERNAL MEDICINE

## 2017-06-09 PROCEDURE — 83036 HEMOGLOBIN GLYCOSYLATED A1C: CPT | Performed by: INTERNAL MEDICINE

## 2017-06-09 PROCEDURE — 80061 LIPID PANEL: CPT | Performed by: INTERNAL MEDICINE

## 2017-06-09 PROCEDURE — 80053 COMPREHEN METABOLIC PANEL: CPT | Performed by: INTERNAL MEDICINE

## 2017-06-27 ENCOUNTER — OFFICE VISIT (OUTPATIENT)
Dept: PAIN MEDICINE | Facility: CLINIC | Age: 57
End: 2017-06-27

## 2017-06-27 ENCOUNTER — APPOINTMENT (OUTPATIENT)
Dept: LAB | Facility: HOSPITAL | Age: 57
End: 2017-06-27

## 2017-06-27 VITALS
DIASTOLIC BLOOD PRESSURE: 72 MMHG | WEIGHT: 109.4 LBS | HEIGHT: 61 IN | SYSTOLIC BLOOD PRESSURE: 130 MMHG | BODY MASS INDEX: 20.65 KG/M2

## 2017-06-27 DIAGNOSIS — M51.36 DDD (DEGENERATIVE DISC DISEASE), LUMBAR: Primary | ICD-10-CM

## 2017-06-27 DIAGNOSIS — Z79.899 HIGH RISK MEDICATIONS (NOT ANTICOAGULANTS) LONG-TERM USE: ICD-10-CM

## 2017-06-27 DIAGNOSIS — M54.16 LUMBAR RADICULOPATHY: ICD-10-CM

## 2017-06-27 PROCEDURE — 80307 DRUG TEST PRSMV CHEM ANLYZR: CPT | Performed by: PAIN MEDICINE

## 2017-06-27 PROCEDURE — 99214 OFFICE O/P EST MOD 30 MIN: CPT | Performed by: PAIN MEDICINE

## 2017-06-27 PROCEDURE — G0481 DRUG TEST DEF 8-14 CLASSES: HCPCS | Performed by: PAIN MEDICINE

## 2017-06-27 RX ORDER — OXYCODONE AND ACETAMINOPHEN 10; 325 MG/1; MG/1
1 TABLET ORAL 4 TIMES DAILY
Qty: 120 TABLET | Refills: 0 | Status: SHIPPED | OUTPATIENT
Start: 2017-06-27 | End: 2017-07-27

## 2017-06-27 NOTE — PROGRESS NOTES
"Hollie Pedro is a 57 y.o. female.   1960    HPI:   Location: lower back and bilateral hip  Quality: sharp  Severity: 7/10  Timing: constant  Alleviating: pain medication  Aggravating: increased activity       Patient reports that the opioid medication still provides her good relief and allows increased activity than she would have without the opioid medication.  She denies side effects.      The following portions of the patient's history were reviewed by me and updated as appropriate: allergies, current medications, past family history, past medical history, past social history, past surgical history and problem list.    Past Medical History:   Diagnosis Date   • Acute bronchitis    • Adenomatous polyp of colon    • Arthropathy of lumbar facet joint    • Asthma    • Benign essential hypertension    • Chronic obstructive lung disease    • Chronic pain    • Degeneration of lumbar intervertebral disc    • Degenerative joint disease involving multiple joints    • Depressive disorder    • Drug therapy     Long-term drug therapy   • Drug therapy     Other long term (current) drug therapy   • Emphysema/COPD     \"Emphysema\"   • Encounter for gynecological examination     Gynecologic examination   • Encounter for gynecological examination without abnormal finding     Encounter for gynecological examination (general) (routine) without abnormal findings   • Encounter for screening for malignant neoplasm of colon     Screening for malignant neoplasm of colon   • Epigastric pain    • Gastroesophageal reflux disease    • Generalized anxiety disorder    • History of bone density study 10/17/2014    DEXA BONE DENSITY 34511 ([facility]) (2) - SAMANTHA MAGANA ([facility])    • History of diagnostic mammography 04/28/2016    DIAG MAMM, UNILAT RT DIG  (Medicare) ([facility]) (1) - ZAYRA JENKINS ([facility])    • History of mammogram     Other screening mammogram   • History of mammogram 10/29/2015    MAMMOGRAM UNILATERAL RT 79740 ([facility]) (1) - SAMANTHA MAGANA " "([facility])    • History of mammogram 10/26/2015    SCREENING MAMMOGRAM 44725 ([facility]) (4) - SAMANTHA MAGANA ([facility])    • Hyperlipidemia    • Hypertensive disorder    • Left flank pain    • Lumbosacral radiculitis    • Menopause     Menopause - controlled on prempro   • Mild recurrent major depression    • Myofascial pain    • Osteoarthritis    • Osteopenia    • Osteoporosis    • Pain in lower limb    • Paresthesia    • Pleurisy    • Prolapse of vaginal walls without uterine prolapse    • Proximal muscle weakness    • Rash     C/O: a rash - legs, arms, and abdomen.   • Smoker    • Syncope    • Transient cerebral ischemia    • Unexplained weight loss     Unexplained weight loss - No obvious GI reason   • Weakness of face muscles        Social History     Social History   • Marital status: Single     Spouse name: N/A   • Number of children: N/A   • Years of education: N/A     Occupational History   • Not on file.     Social History Main Topics   • Smoking status: Current Every Day Smoker     Types: Cigarettes, Electronic Cigarette   • Smokeless tobacco: Never Used      Comment: Interested in quitting smoking; Amount: 1-9 cigs/day  using the E cigarette. She is smoking 6-8 per day   • Alcohol use No   • Drug use: No   • Sexual activity: Defer      Comment: Marital status: Single     Other Topics Concern   • Not on file     Social History Narrative       Family History   Problem Relation Age of Onset   • Diabetes Mother    • Heart disease Mother    • Hypertension Mother    • Clotting disorder Mother      \"blood clots in lungs and leg after surgery\"   • Lung cancer Father    • Heart disease Sister    • Hypertension Sister    • Heart disease Brother    • Hypertension Brother    • Breast cancer Neg Hx    • Colon cancer Neg Hx      Colorectal cancer   • Endometrial cancer Neg Hx    • Ovarian cancer Neg Hx          Current Outpatient Prescriptions:   •  albuterol (PROVENTIL) (2.5 MG/3ML) 0.083% nebulizer solution, Take 2.5 mg by " nebulization 4 (Four) Times a Day. Use 1 Vial, Disp: 120 mL, Rfl: 5  •  ALPRAZolam (XANAX) 1 MG tablet, Take 1 mg by mouth 3 (Three) Times a Day., Disp: , Rfl:   •  amitriptyline (ELAVIL) 150 MG tablet, Take 150 mg by mouth., Disp: , Rfl:   •  aspirin 81 MG chewable tablet, Chew 81 mg daily., Disp: , Rfl:   •  ASPIRIN LOW DOSE 81 MG EC tablet, , Disp: , Rfl: 12  •  atorvastatin (LIPITOR) 40 MG tablet, Take 1 tablet by mouth Daily., Disp: 30 tablet, Rfl: 5  •  benzonatate (TESSALON) 100 MG capsule, , Disp: , Rfl:   •  budesonide-formoterol (SYMBICORT) 160-4.5 MCG/ACT inhaler, Inhale 1 puff 2 (Two) Times a Day., Disp: 6 g, Rfl: 5  •  buPROPion SR (WELLBUTRIN SR) 150 MG 12 hr tablet, Take 1 tablet by mouth., Disp: , Rfl:   •  Calcium Carbonate-Vit D-Min (CALTRATE PLUS PO), Take 1 tablet by mouth 2 (two) times a day., Disp: , Rfl:   •  cetirizine (zyrTEC) 10 MG tablet, Take 1 tablet by mouth Daily., Disp: 30 tablet, Rfl: 5  •  Cholecalciferol 5000 UNITS tablet, Take 5,000 Units by mouth Daily., Disp: 30 tablet, Rfl: 5  •  diltiazem La (CARDIZEM LA) 420 MG 24 hr tablet, Take 1 tablet by mouth Daily. PER DR. MARTINEZ; Med Name: diltiazem  mg tablet,extended release 24 hr, Disp: 30 tablet, Rfl: 5  •  Docusate Calcium (STOOL SOFTENER PO), Take 1 tablet by mouth as needed., Disp: , Rfl:   •  fluticasone (FLONASE) 50 MCG/ACT nasal spray, 1-2 sprays into each nostril Daily., Disp: 1 each, Rfl: 5  •  fluticasone-salmeterol (ADVAIR DISKUS) 250-50 MCG/DOSE DISKUS, Inhale 1 puff Every 12 (Twelve) Hours., Disp: 60 each, Rfl: 5  •  furosemide (LASIX) 20 MG tablet, Take 1 tablet by mouth Daily., Disp: 30 tablet, Rfl: 5  •  ipratropium (ATROVENT HFA) 17 MCG/ACT inhaler, Inhale 2 puffs 4 (Four) Times a Day., Disp: 1 inhaler, Rfl: 5  •  nitroglycerin (NITROSTAT) 0.4 MG SL tablet, Place 0.4 mg under the tongue Every 5 (Five) Minutes., Disp: , Rfl:   •  ondansetron (ZOFRAN) 4 MG tablet, Take 4 mg by mouth Every 8 (Eight) Hours As  "Needed for nausea or vomiting., Disp: , Rfl:   •  oxyCODONE-acetaminophen (PERCOCET)  MG per tablet, Take 1 tablet by mouth 4 (Four) Times a Day., Disp: , Rfl:   •  PARoxetine (PAXIL) 20 MG tablet, Take 1 tablet by mouth Every Morning., Disp: 30 tablet, Rfl: 5  •  raNITIdine (ZANTAC) 300 MG tablet, Take 1 tablet by mouth 2 (Two) Times a Day., Disp: 60 tablet, Rfl: 5  •  valACYclovir (VALTREX) 500 MG tablet, Take 1 tablet by mouth 2 (Two) Times a Day., Disp: 60 tablet, Rfl: 1  •  vitamin D (ERGOCALCIFEROL) 69098 UNITS capsule capsule, Take 1 capsule by mouth Every 14 (Fourteen) Days., Disp: 2 capsule, Rfl: 5  •  oxyCODONE-acetaminophen (PERCOCET)  MG per tablet, Take 1 tablet by mouth 4 (Four) Times a Day for 30 days., Disp: 120 tablet, Rfl: 0  •  oxyCODONE-acetaminophen (PERCOCET)  MG per tablet, Take 1 tablet by mouth 4 (Four) Times a Day for 30 days., Disp: 120 tablet, Rfl: 0    Allergies   Allergen Reactions   • Ciprofloxacin    • Ditropan Xl [Oxybutynin] Other (See Comments)     Other reaction(s): YEAST   • Gabapentin    • Ibuprofen Nausea And Vomiting   • Naprosyn [Naproxen] Nausea And Vomiting   • Olanzapine    • Phenergan [Promethazine] Hallucinations   • Plavix [Clopidogrel]    • Prednisone Hives   • Prilosec [Omeprazole] Nausea And Vomiting   • Tylenol [Acetaminophen] Nausea And Vomiting     Tylenol-Codeine #3   • Valium [Diazepam] Other (See Comments)     Other reaction(s): \"winds me up\"   • Carafate [Sucralfate] Rash   • Celebrex [Celecoxib] Rash   • Cephalexin Rash   • Doxycycline Monohydrate [Doxycycline] Rash and Other (See Comments)     Other reaction(s): SICK   • Other      Cipro:  Rash  Darvocet-N 100:  N/V   • Relafen [Nabumetone] Nausea And Vomiting and Rash       Review of Systems   Musculoskeletal: Positive for back pain.        B.hip pain     All other systems reviewed and are negative.    All systems reviewed and negative except for above.    Physical Exam   Constitutional: " She appears well-developed and well-nourished. No distress.   Musculoskeletal:        Lumbar back: She exhibits decreased range of motion (ext limited to less than 5 deg.  checked while seated).   Neurological: She is alert.   Walker for ambulation   Psychiatric: She has a normal mood and affect. Her behavior is normal. Judgment normal.       Hollie was seen today for back pain and pain.    Diagnoses and all orders for this visit:    DDD (degenerative disc disease), lumbar  -     ToxASSURE Select 13 (MW)    Lumbar radiculopathy  -     ToxASSURE Select 13 (MW)    High risk medications (not anticoagulants) long-term use  -     ToxASSURE Select 13 (MW)    Other orders  -     oxyCODONE-acetaminophen (PERCOCET)  MG per tablet; Take 1 tablet by mouth 4 (Four) Times a Day for 30 days.  -     oxyCODONE-acetaminophen (PERCOCET)  MG per tablet; Take 1 tablet by mouth 4 (Four) Times a Day for 30 days.      Medication: Patient reports no negative side effects, Patient reports appropriate usage and storage habits, Patient's opioid provides enough reflief to be more active and perform activities of daily living with less discomfort. and Refill opioid medication as above    Interventional: none at this time    Rehab: none at this time    Behavioral: No aberrant behavior noted. [facility]RE[facility] Report #[id number]  was reviewed and is consistent with stated history    Urine drug screen Ordered today to test for drugs of abuse and prescribed medications          This document has been electronically signed by Kyle Her MD on June 27, 2017 11:22 AM

## 2017-07-06 LAB — CONV REPORT SUMMARY: NORMAL

## 2017-08-23 ENCOUNTER — OFFICE VISIT (OUTPATIENT)
Dept: PAIN MEDICINE | Facility: CLINIC | Age: 57
End: 2017-08-23

## 2017-08-23 VITALS
BODY MASS INDEX: 20.41 KG/M2 | DIASTOLIC BLOOD PRESSURE: 72 MMHG | HEIGHT: 61 IN | WEIGHT: 108.1 LBS | SYSTOLIC BLOOD PRESSURE: 140 MMHG

## 2017-08-23 DIAGNOSIS — M51.36 DDD (DEGENERATIVE DISC DISEASE), LUMBAR: Primary | ICD-10-CM

## 2017-08-23 DIAGNOSIS — M54.16 LUMBAR RADICULOPATHY: ICD-10-CM

## 2017-08-23 DIAGNOSIS — Z79.899 HIGH RISK MEDICATIONS (NOT ANTICOAGULANTS) LONG-TERM USE: ICD-10-CM

## 2017-08-23 PROCEDURE — 99214 OFFICE O/P EST MOD 30 MIN: CPT | Performed by: PAIN MEDICINE

## 2017-08-23 RX ORDER — OXYCODONE AND ACETAMINOPHEN 10; 325 MG/1; MG/1
1 TABLET ORAL 4 TIMES DAILY
Qty: 120 TABLET | Refills: 0 | Status: SHIPPED | OUTPATIENT
Start: 2017-08-23 | End: 2017-09-22

## 2017-08-23 RX ORDER — MECLIZINE HCL 25MG 25 MG/1
25 TABLET, CHEWABLE ORAL
COMMUNITY
Start: 2017-08-21 | End: 2017-09-01

## 2017-08-23 RX ORDER — AMOXICILLIN AND CLAVULANATE POTASSIUM 875; 125 MG/1; MG/1
1 TABLET, FILM COATED ORAL
COMMUNITY
End: 2017-12-15

## 2017-08-23 NOTE — PROGRESS NOTES
"Hollie Pedro is a 57 y.o. female.   1960    HPI:   Location: lower back and bilateral hip  Quality: sharp  Severity: 6/10  Timing: constant  Alleviating: pain medication  Aggravating: increased activity     Patient reports that the opioid medication still provides her good relief and allows increased activity than she would have without the opioid medication.  She denies side effects.  Psychiatrist is adjusting benzos.       The following portions of the patient's history were reviewed by me and updated as appropriate: allergies, current medications, past family history, past medical history, past social history, past surgical history and problem list.    Past Medical History:   Diagnosis Date   • Acute bronchitis    • Adenomatous polyp of colon    • Arthropathy of lumbar facet joint    • Asthma    • Benign essential hypertension    • Chronic obstructive lung disease    • Chronic pain    • Degeneration of lumbar intervertebral disc    • Degenerative joint disease involving multiple joints    • Depressive disorder    • Drug therapy     Long-term drug therapy   • Drug therapy     Other long term (current) drug therapy   • Emphysema/COPD     \"Emphysema\"   • Encounter for gynecological examination     Gynecologic examination   • Encounter for gynecological examination without abnormal finding     Encounter for gynecological examination (general) (routine) without abnormal findings   • Encounter for screening for malignant neoplasm of colon     Screening for malignant neoplasm of colon   • Epigastric pain    • Gastroesophageal reflux disease    • Generalized anxiety disorder    • History of bone density study 10/17/2014    DEXA BONE DENSITY 78214 ([facility]) (2) - SAAMNTHA MAGANA ([facility])    • History of diagnostic mammography 04/28/2016    DIAG MAMM, UNILAT RT DIG  (Medicare) ([facility]) (1) - ZAYRA JENKINS ([facility])    • History of mammogram     Other screening mammogram   • History of mammogram 10/29/2015    MAMMOGRAM UNILATERAL " "RT 15122 ([facility]) (1) - SAMANTHA MAGANA ([facility])    • History of mammogram 10/26/2015    SCREENING MAMMOGRAM 39279 ([facility]) (4) - SAMANTHA MAGANA ([facility])    • Hyperlipidemia    • Hypertensive disorder    • Left flank pain    • Lumbosacral radiculitis    • Menopause     Menopause - controlled on prempro   • Mild recurrent major depression    • Myofascial pain    • Osteoarthritis    • Osteopenia    • Osteoporosis    • Pain in lower limb    • Paresthesia    • Pleurisy    • Prolapse of vaginal walls without uterine prolapse    • Proximal muscle weakness    • Rash     C/O: a rash - legs, arms, and abdomen.   • Smoker    • Syncope    • Transient cerebral ischemia    • Unexplained weight loss     Unexplained weight loss - No obvious GI reason   • Weakness of face muscles        Social History     Social History   • Marital status: Single     Spouse name: N/A   • Number of children: N/A   • Years of education: N/A     Occupational History   • Not on file.     Social History Main Topics   • Smoking status: Current Every Day Smoker     Types: Cigarettes, Electronic Cigarette   • Smokeless tobacco: Never Used      Comment: Interested in quitting smoking; Amount: 1-9 cigs/day  using the E cigarette. She is smoking 6-8 per day   • Alcohol use No   • Drug use: No   • Sexual activity: Defer      Comment: Marital status: Single     Other Topics Concern   • Not on file     Social History Narrative       Family History   Problem Relation Age of Onset   • Diabetes Mother    • Heart disease Mother    • Hypertension Mother    • Clotting disorder Mother      \"blood clots in lungs and leg after surgery\"   • Lung cancer Father    • Heart disease Sister    • Hypertension Sister    • Heart disease Brother    • Hypertension Brother    • Breast cancer Neg Hx    • Colon cancer Neg Hx      Colorectal cancer   • Endometrial cancer Neg Hx    • Ovarian cancer Neg Hx          Current Outpatient Prescriptions:   •  albuterol (PROVENTIL) (2.5 MG/3ML) 0.083% nebulizer " solution, Take 2.5 mg by nebulization 4 (Four) Times a Day. Use 1 Vial, Disp: 120 mL, Rfl: 5  •  amitriptyline (ELAVIL) 150 MG tablet, Take 150 mg by mouth., Disp: , Rfl:   •  amoxicillin-clavulanate (AUGMENTIN) 875-125 MG per tablet, Take 1 tablet by mouth., Disp: , Rfl:   •  aspirin 81 MG chewable tablet, Chew 81 mg daily., Disp: , Rfl:   •  ASPIRIN LOW DOSE 81 MG EC tablet, , Disp: , Rfl: 12  •  atorvastatin (LIPITOR) 40 MG tablet, Take 1 tablet by mouth Daily., Disp: 30 tablet, Rfl: 5  •  benzonatate (TESSALON) 100 MG capsule, , Disp: , Rfl:   •  budesonide-formoterol (SYMBICORT) 160-4.5 MCG/ACT inhaler, Inhale 1 puff 2 (Two) Times a Day., Disp: 6 g, Rfl: 5  •  buPROPion SR (WELLBUTRIN SR) 150 MG 12 hr tablet, Take 1 tablet by mouth., Disp: , Rfl:   •  Calcium Carbonate-Vit D-Min (CALTRATE PLUS PO), Take 1 tablet by mouth 2 (two) times a day., Disp: , Rfl:   •  cetirizine (zyrTEC) 10 MG tablet, Take 1 tablet by mouth Daily., Disp: 30 tablet, Rfl: 5  •  Cholecalciferol 5000 UNITS tablet, Take 5,000 Units by mouth Daily., Disp: 30 tablet, Rfl: 5  •  diltiazem La (CARDIZEM LA) 420 MG 24 hr tablet, Take 1 tablet by mouth Daily. PER DR. MARTINEZ; Med Name: diltiazem  mg tablet,extended release 24 hr, Disp: 30 tablet, Rfl: 5  •  Docusate Calcium (STOOL SOFTENER PO), Take 1 tablet by mouth as needed., Disp: , Rfl:   •  fluticasone (FLONASE) 50 MCG/ACT nasal spray, 1-2 sprays into each nostril Daily., Disp: 1 each, Rfl: 5  •  fluticasone-salmeterol (ADVAIR DISKUS) 250-50 MCG/DOSE DISKUS, Inhale 1 puff Every 12 (Twelve) Hours., Disp: 60 each, Rfl: 5  •  furosemide (LASIX) 20 MG tablet, Take 1 tablet by mouth Daily., Disp: 30 tablet, Rfl: 5  •  ipratropium (ATROVENT HFA) 17 MCG/ACT inhaler, Inhale 2 puffs 4 (Four) Times a Day., Disp: 1 inhaler, Rfl: 5  •  meclizine 25 MG chewable tablet chewable tablet, Chew 25 mg., Disp: , Rfl:   •  nitroglycerin (NITROSTAT) 0.4 MG SL tablet, Place 0.4 mg under the tongue Every 5  "(Five) Minutes., Disp: , Rfl:   •  ondansetron (ZOFRAN) 4 MG tablet, Take 4 mg by mouth Every 8 (Eight) Hours As Needed for nausea or vomiting., Disp: , Rfl:   •  oxyCODONE-acetaminophen (PERCOCET)  MG per tablet, Take 1 tablet by mouth 4 (Four) Times a Day., Disp: , Rfl:   •  PARoxetine (PAXIL) 20 MG tablet, Take 1 tablet by mouth Every Morning., Disp: 30 tablet, Rfl: 5  •  raNITIdine (ZANTAC) 300 MG tablet, Take 1 tablet by mouth 2 (Two) Times a Day., Disp: 60 tablet, Rfl: 5  •  valACYclovir (VALTREX) 500 MG tablet, Take 1 tablet by mouth 2 (Two) Times a Day., Disp: 60 tablet, Rfl: 1  •  vitamin D (ERGOCALCIFEROL) 57605 UNITS capsule capsule, Take 1 capsule by mouth Every 14 (Fourteen) Days., Disp: 2 capsule, Rfl: 5  •  oxyCODONE-acetaminophen (PERCOCET)  MG per tablet, Take 1 tablet by mouth 4 (Four) Times a Day for 30 days., Disp: 120 tablet, Rfl: 0  •  oxyCODONE-acetaminophen (PERCOCET)  MG per tablet, Take 1 tablet by mouth 4 (Four) Times a Day for 30 days., Disp: 120 tablet, Rfl: 0    Allergies   Allergen Reactions   • Ciprofloxacin    • Ditropan Xl [Oxybutynin] Other (See Comments)     Other reaction(s): YEAST   • Gabapentin    • Ibuprofen Nausea And Vomiting   • Naprosyn [Naproxen] Nausea And Vomiting   • Olanzapine    • Phenergan [Promethazine] Hallucinations   • Plavix [Clopidogrel]    • Prednisone Hives   • Prilosec [Omeprazole] Nausea And Vomiting   • Tylenol [Acetaminophen] Nausea And Vomiting     Tylenol-Codeine #3   • Valium [Diazepam] Other (See Comments)     Other reaction(s): \"winds me up\"   • Carafate [Sucralfate] Rash   • Celebrex [Celecoxib] Rash   • Cephalexin Rash   • Doxycycline Monohydrate [Doxycycline] Rash and Other (See Comments)     Other reaction(s): SICK   • Lorazepam Itching and Palpitations   • Other      Cipro:  Rash  Darvocet-N 100:  N/V   • Relafen [Nabumetone] Nausea And Vomiting and Rash       Review of Systems   Musculoskeletal: Positive for back pain " (lower).        Bilateral hip pain   All other systems reviewed and are negative.    All systems reviewed and negative except for above.    Physical Exam   Constitutional: She appears well-developed and well-nourished. No distress.   Musculoskeletal:        Lumbar back: She exhibits decreased range of motion (ext limited to less than 5 deg.  checked while seated).   Neurological: She is alert.   Walker for ambulation   Psychiatric: She has a normal mood and affect. Her behavior is normal. Judgment normal.       Hollie was seen today for back pain and hip pain.    Diagnoses and all orders for this visit:    DDD (degenerative disc disease), lumbar    Lumbar radiculopathy    High risk medications (not anticoagulants) long-term use    Other orders  -     oxyCODONE-acetaminophen (PERCOCET)  MG per tablet; Take 1 tablet by mouth 4 (Four) Times a Day for 30 days.  -     oxyCODONE-acetaminophen (PERCOCET)  MG per tablet; Take 1 tablet by mouth 4 (Four) Times a Day for 30 days.      Medication: Patient reports no negative side effects, Patient reports appropriate usage and storage habits, Patient's opioid provides enough reflief to be more active and perform activities of daily living with less discomfort. and Refill opioid medication as above.  Discussed new cdc guidelines regarding prescription of benzo and opioids simultaneously.  Pt states she will discuss with her psychiatrist before next visit.     Interventional: none at this time    Rehab: none at this time    Behavioral: No aberrant behavior noted. [facility]RE[facility] Report #[id number]  was reviewed and is consistent with stated history    Urine drug screen Reviewed from last visit and is appropriate          This document has been electronically signed by Kyle Her MD on August 23, 2017 11:16 AM

## 2017-10-19 ENCOUNTER — OFFICE VISIT (OUTPATIENT)
Dept: PAIN MEDICINE | Facility: CLINIC | Age: 57
End: 2017-10-19

## 2017-10-19 ENCOUNTER — APPOINTMENT (OUTPATIENT)
Dept: LAB | Facility: HOSPITAL | Age: 57
End: 2017-10-19

## 2017-10-19 VITALS
WEIGHT: 118 LBS | HEIGHT: 61 IN | DIASTOLIC BLOOD PRESSURE: 80 MMHG | BODY MASS INDEX: 22.28 KG/M2 | SYSTOLIC BLOOD PRESSURE: 130 MMHG

## 2017-10-19 DIAGNOSIS — Z79.899 HIGH RISK MEDICATIONS (NOT ANTICOAGULANTS) LONG-TERM USE: ICD-10-CM

## 2017-10-19 DIAGNOSIS — M54.16 LUMBAR RADICULOPATHY: ICD-10-CM

## 2017-10-19 DIAGNOSIS — M51.36 DDD (DEGENERATIVE DISC DISEASE), LUMBAR: Primary | ICD-10-CM

## 2017-10-19 PROCEDURE — 80307 DRUG TEST PRSMV CHEM ANLYZR: CPT | Performed by: PAIN MEDICINE

## 2017-10-19 PROCEDURE — 99214 OFFICE O/P EST MOD 30 MIN: CPT | Performed by: PAIN MEDICINE

## 2017-10-19 PROCEDURE — G0481 DRUG TEST DEF 8-14 CLASSES: HCPCS | Performed by: PAIN MEDICINE

## 2017-10-19 RX ORDER — CLONAZEPAM 1 MG/1
2 TABLET ORAL 4 TIMES DAILY
COMMUNITY
Start: 2017-09-22

## 2017-10-19 RX ORDER — OXYCODONE AND ACETAMINOPHEN 10; 325 MG/1; MG/1
1 TABLET ORAL 4 TIMES DAILY
Qty: 120 TABLET | Refills: 0 | Status: SHIPPED | OUTPATIENT
Start: 2017-10-19 | End: 2017-11-18

## 2017-10-19 NOTE — PROGRESS NOTES
"Hollie Pedro is a 57 y.o. female.   1960    HPI:   Location: lower back and bilateral hip  Quality: sharp  Severity: 7/10  Timing: constant  Alleviating: pain medication  Aggravating: increased activity     Still working on benzo reduction with psych.  Patient denies side effects, she reports that her opioid medication still provides significant relief and allows her to be more active.      The following portions of the patient's history were reviewed by me and updated as appropriate: allergies, current medications, past family history, past medical history, past social history, past surgical history and problem list.    Past Medical History:   Diagnosis Date   • Acute bronchitis    • Adenomatous polyp of colon    • Arthropathy of lumbar facet joint    • Asthma    • Benign essential hypertension    • Chronic obstructive lung disease    • Chronic pain    • Degeneration of lumbar intervertebral disc    • Degenerative joint disease involving multiple joints    • Depressive disorder    • Drug therapy     Long-term drug therapy   • Drug therapy     Other long term (current) drug therapy   • Emphysema/COPD     \"Emphysema\"   • Encounter for gynecological examination     Gynecologic examination   • Encounter for gynecological examination without abnormal finding     Encounter for gynecological examination (general) (routine) without abnormal findings   • Encounter for screening for malignant neoplasm of colon     Screening for malignant neoplasm of colon   • Epigastric pain    • Gastroesophageal reflux disease    • Generalized anxiety disorder    • History of bone density study 10/17/2014    DEXA BONE DENSITY 21420 ([facility]) (2) - SAMANTHA MAGANA ([facility])    • History of diagnostic mammography 04/28/2016    DIAG MAMM, UNILAT RT DIG  (Medicare) ([facility]) (1) - ZAYRA JENKINS ([facility])    • History of mammogram     Other screening mammogram   • History of mammogram 10/29/2015    MAMMOGRAM UNILATERAL RT 20762 ([facility]) (1) - SAMANTHA MAGANA " "([facility])    • History of mammogram 10/26/2015    SCREENING MAMMOGRAM 76584 ([facility]) (4) - SAMANTHA MAGANA ([facility])    • Hyperlipidemia    • Hypertensive disorder    • Left flank pain    • Lumbosacral radiculitis    • Menopause     Menopause - controlled on prempro   • Mild recurrent major depression    • Myofascial pain    • Osteoarthritis    • Osteopenia    • Osteoporosis    • Pain in lower limb    • Paresthesia    • Pleurisy    • Prolapse of vaginal walls without uterine prolapse    • Proximal muscle weakness    • Rash     C/O: a rash - legs, arms, and abdomen.   • Smoker    • Syncope    • Transient cerebral ischemia    • Unexplained weight loss     Unexplained weight loss - No obvious GI reason   • Weakness of face muscles        Social History     Social History   • Marital status: Single     Spouse name: N/A   • Number of children: N/A   • Years of education: N/A     Occupational History   • Not on file.     Social History Main Topics   • Smoking status: Current Every Day Smoker     Types: Cigarettes, Electronic Cigarette   • Smokeless tobacco: Never Used      Comment: Interested in quitting smoking; Amount: 1-9 cigs/day  using the E cigarette. She is smoking 6-8 per day   • Alcohol use No   • Drug use: No   • Sexual activity: Defer      Comment: Marital status: Single     Other Topics Concern   • Not on file     Social History Narrative       Family History   Problem Relation Age of Onset   • Diabetes Mother    • Heart disease Mother    • Hypertension Mother    • Clotting disorder Mother      \"blood clots in lungs and leg after surgery\"   • Lung cancer Father    • Heart disease Sister    • Hypertension Sister    • Heart disease Brother    • Hypertension Brother    • Breast cancer Neg Hx    • Colon cancer Neg Hx      Colorectal cancer   • Endometrial cancer Neg Hx    • Ovarian cancer Neg Hx          Current Outpatient Prescriptions:   •  albuterol (PROVENTIL) (2.5 MG/3ML) 0.083% nebulizer solution, Take 2.5 mg by " nebulization 4 (Four) Times a Day. Use 1 Vial, Disp: 120 mL, Rfl: 5  •  amitriptyline (ELAVIL) 150 MG tablet, Take 150 mg by mouth., Disp: , Rfl:   •  amoxicillin-clavulanate (AUGMENTIN) 875-125 MG per tablet, Take 1 tablet by mouth., Disp: , Rfl:   •  aspirin 81 MG chewable tablet, Chew 81 mg daily., Disp: , Rfl:   •  ASPIRIN LOW DOSE 81 MG EC tablet, , Disp: , Rfl: 12  •  atorvastatin (LIPITOR) 40 MG tablet, Take 1 tablet by mouth Daily., Disp: 30 tablet, Rfl: 5  •  benzonatate (TESSALON) 100 MG capsule, , Disp: , Rfl:   •  budesonide-formoterol (SYMBICORT) 160-4.5 MCG/ACT inhaler, Inhale 1 puff 2 (Two) Times a Day., Disp: 6 g, Rfl: 5  •  buPROPion SR (WELLBUTRIN SR) 150 MG 12 hr tablet, Take 1 tablet by mouth., Disp: , Rfl:   •  Calcium Carbonate-Vit D-Min (CALTRATE PLUS PO), Take 1 tablet by mouth 2 (two) times a day., Disp: , Rfl:   •  cetirizine (zyrTEC) 10 MG tablet, Take 1 tablet by mouth Daily., Disp: 30 tablet, Rfl: 5  •  Cholecalciferol 5000 UNITS tablet, Take 5,000 Units by mouth Daily., Disp: 30 tablet, Rfl: 5  •  clonazePAM (KlonoPIN) 1 MG tablet, , Disp: , Rfl:   •  diltiazem La (CARDIZEM LA) 420 MG 24 hr tablet, Take 1 tablet by mouth Daily. PER DR. MARTINEZ; Med Name: diltiazem  mg tablet,extended release 24 hr, Disp: 30 tablet, Rfl: 5  •  Docusate Calcium (STOOL SOFTENER PO), Take 1 tablet by mouth as needed., Disp: , Rfl:   •  fluticasone (FLONASE) 50 MCG/ACT nasal spray, 1-2 sprays into each nostril Daily., Disp: 1 each, Rfl: 5  •  fluticasone-salmeterol (ADVAIR DISKUS) 250-50 MCG/DOSE DISKUS, Inhale 1 puff Every 12 (Twelve) Hours., Disp: 60 each, Rfl: 5  •  furosemide (LASIX) 20 MG tablet, Take 1 tablet by mouth Daily., Disp: 30 tablet, Rfl: 5  •  ipratropium (ATROVENT HFA) 17 MCG/ACT inhaler, Inhale 2 puffs 4 (Four) Times a Day., Disp: 1 inhaler, Rfl: 5  •  nitroglycerin (NITROSTAT) 0.4 MG SL tablet, Place 0.4 mg under the tongue Every 5 (Five) Minutes., Disp: , Rfl:   •  ondansetron  "(ZOFRAN) 4 MG tablet, Take 4 mg by mouth Every 8 (Eight) Hours As Needed for nausea or vomiting., Disp: , Rfl:   •  oxyCODONE-acetaminophen (PERCOCET)  MG per tablet, Take 1 tablet by mouth 4 (Four) Times a Day., Disp: , Rfl:   •  PARoxetine (PAXIL) 20 MG tablet, Take 1 tablet by mouth Every Morning., Disp: 30 tablet, Rfl: 5  •  raNITIdine (ZANTAC) 300 MG tablet, Take 1 tablet by mouth 2 (Two) Times a Day., Disp: 60 tablet, Rfl: 5  •  valACYclovir (VALTREX) 500 MG tablet, Take 1 tablet by mouth 2 (Two) Times a Day., Disp: 60 tablet, Rfl: 1  •  vitamin D (ERGOCALCIFEROL) 66218 UNITS capsule capsule, Take 1 capsule by mouth Every 14 (Fourteen) Days., Disp: 2 capsule, Rfl: 5  •  oxyCODONE-acetaminophen (PERCOCET)  MG per tablet, Take 1 tablet by mouth 4 (Four) Times a Day for 30 days., Disp: 120 tablet, Rfl: 0  •  oxyCODONE-acetaminophen (PERCOCET)  MG per tablet, Take 1 tablet by mouth 4 (Four) Times a Day for 30 days., Disp: 120 tablet, Rfl: 0    Allergies   Allergen Reactions   • Ciprofloxacin    • Ditropan Xl [Oxybutynin] Other (See Comments)     Other reaction(s): YEAST   • Gabapentin    • Ibuprofen Nausea And Vomiting   • Naprosyn [Naproxen] Nausea And Vomiting   • Olanzapine    • Phenergan [Promethazine] Hallucinations   • Plavix [Clopidogrel]    • Prednisone Hives   • Prilosec [Omeprazole] Nausea And Vomiting   • Tylenol [Acetaminophen] Nausea And Vomiting     Tylenol-Codeine #3   • Valium [Diazepam] Other (See Comments)     Other reaction(s): \"winds me up\"   • Carafate [Sucralfate] Rash   • Celebrex [Celecoxib] Rash   • Cephalexin Rash   • Doxycycline Monohydrate [Doxycycline] Rash and Other (See Comments)     Other reaction(s): SICK   • Lorazepam Itching and Palpitations   • Other      Cipro:  Rash  Darvocet-N 100:  N/V   • Relafen [Nabumetone] Nausea And Vomiting and Rash       Review of Systems   Musculoskeletal: Positive for back pain (lower).        Bilateral hip pain     All other " systems reviewed and are negative.    All systems reviewed and negative except for above.    Physical Exam   Constitutional: She appears well-developed and well-nourished. No distress.   Musculoskeletal:        Lumbar back: She exhibits decreased range of motion (ext limited to less than 5 deg.  flexion limited to 45 deg due to pain. ).   Neurological: She is alert.   Walker for ambulation   Psychiatric: She has a normal mood and affect. Her behavior is normal. Judgment normal.       Hollie was seen today for back pain and hip pain.    Diagnoses and all orders for this visit:    DDD (degenerative disc disease), lumbar  -     ToxASSURE Select 13 (MW) - Urine, Clean Catch    Lumbar radiculopathy  -     ToxASSURE Select 13 (MW) - Urine, Clean Catch    High risk medications (not anticoagulants) long-term use  -     ToxASSURE Select 13 (MW) - Urine, Clean Catch    Other orders  -     oxyCODONE-acetaminophen (PERCOCET)  MG per tablet; Take 1 tablet by mouth 4 (Four) Times a Day for 30 days.  -     oxyCODONE-acetaminophen (PERCOCET)  MG per tablet; Take 1 tablet by mouth 4 (Four) Times a Day for 30 days.        Medication: Patient reports no negative side effects, Patient reports appropriate usage and storage habits, Patient's opioid provides enough relief to be more active and perform activities of daily living with less discomfort. and Refill opioid medication as above.    Interventional: none at this time    Rehab: none at this time    Behavioral: No aberrant behavior noted. [facility] Report #[id number]  was reviewed and is consistent with stated history    Urine drug screen Ordered today to test for drugs of abuse and prescribed medications          This document has been electronically signed by Kyle Her MD on October 19, 2017 11:48 AM

## 2017-10-27 LAB — CONV REPORT SUMMARY: NORMAL

## 2017-10-30 ENCOUNTER — APPOINTMENT (OUTPATIENT)
Dept: MAMMOGRAPHY | Facility: CLINIC | Age: 57
End: 2017-10-30

## 2017-10-30 DIAGNOSIS — Z12.31 VISIT FOR SCREENING MAMMOGRAM: ICD-10-CM

## 2017-10-30 PROCEDURE — 77063 BREAST TOMOSYNTHESIS BI: CPT | Performed by: INTERNAL MEDICINE

## 2017-10-30 PROCEDURE — 77067 SCR MAMMO BI INCL CAD: CPT | Performed by: INTERNAL MEDICINE

## 2017-12-15 ENCOUNTER — OFFICE VISIT (OUTPATIENT)
Dept: PAIN MEDICINE | Facility: CLINIC | Age: 57
End: 2017-12-15

## 2017-12-15 VITALS
DIASTOLIC BLOOD PRESSURE: 60 MMHG | WEIGHT: 126 LBS | HEIGHT: 61 IN | BODY MASS INDEX: 23.79 KG/M2 | SYSTOLIC BLOOD PRESSURE: 118 MMHG

## 2017-12-15 DIAGNOSIS — M54.16 LUMBAR RADICULOPATHY: ICD-10-CM

## 2017-12-15 DIAGNOSIS — Z79.899 HIGH RISK MEDICATIONS (NOT ANTICOAGULANTS) LONG-TERM USE: ICD-10-CM

## 2017-12-15 DIAGNOSIS — M51.36 DDD (DEGENERATIVE DISC DISEASE), LUMBAR: Primary | ICD-10-CM

## 2017-12-15 DIAGNOSIS — Z74.09 IMPAIRED MOBILITY: ICD-10-CM

## 2017-12-15 DIAGNOSIS — F17.200 SMOKING ADDICTION: ICD-10-CM

## 2017-12-15 PROCEDURE — 99214 OFFICE O/P EST MOD 30 MIN: CPT | Performed by: PAIN MEDICINE

## 2017-12-15 NOTE — PROGRESS NOTES
"Hollie Pedro is a 57 y.o. female.   1960    HPI:   Location: lower back and bilateral hip  Quality: sharp  Severity: 7/10  Timing: constant  Alleviating: pain medication  Aggravating: increased activity     Pt reports chronic lower back and bilateral hip pain. Pt uses walker for ambulation. Opiate medications provides enough relief for daily activity and ambulation. NO SE. Pt happy with pain management visit and regimen.           The following portions of the patient's history were reviewed by me and updated as appropriate: allergies, current medications, past family history, past medical history, past social history, past surgical history and problem list.    Past Medical History:   Diagnosis Date   • Acute bronchitis    • Adenomatous polyp of colon    • Arthropathy of lumbar facet joint    • Asthma    • Benign essential hypertension    • Chronic obstructive lung disease    • Chronic pain    • Degeneration of lumbar intervertebral disc    • Degenerative joint disease involving multiple joints    • Depressive disorder    • Drug therapy     Long-term drug therapy   • Drug therapy     Other long term (current) drug therapy   • Emphysema/COPD     \"Emphysema\"   • Encounter for gynecological examination     Gynecologic examination   • Encounter for gynecological examination without abnormal finding     Encounter for gynecological examination (general) (routine) without abnormal findings   • Encounter for screening for malignant neoplasm of colon     Screening for malignant neoplasm of colon   • Epigastric pain    • Gastroesophageal reflux disease    • Generalized anxiety disorder    • History of bone density study 10/17/2014    DEXA BONE DENSITY 30671 ([facility]) (2) - SAMANTHA MAGANA ([facility])    • History of diagnostic mammography 04/28/2016    DIAG MAMM, UNILAT RT DIG  (Medicare) ([facility]) (1) - ZAYRA JENKINS ([facility])    • History of mammogram     Other screening mammogram   • History of mammogram 10/29/2015    MAMMOGRAM " "UNILATERAL RT 30275 ([facility]) (1) - SAMANTHA MAGANA ([facility])    • History of mammogram 10/26/2015    SCREENING MAMMOGRAM 45675 ([facility]) (4) - SAMANTHA MAGANA ([facility])    • Hyperlipidemia    • Hypertensive disorder    • Left flank pain    • Lumbosacral radiculitis    • Menopause     Menopause - controlled on prempro   • Mild recurrent major depression    • Myofascial pain    • Osteoarthritis    • Osteopenia    • Osteoporosis    • Pain in lower limb    • Paresthesia    • Pleurisy    • Prolapse of vaginal walls without uterine prolapse    • Proximal muscle weakness    • Rash     C/O: a rash - legs, arms, and abdomen.   • Smoker    • Syncope    • Transient cerebral ischemia    • Unexplained weight loss     Unexplained weight loss - No obvious GI reason   • Weakness of face muscles        Social History     Social History   • Marital status: Single     Spouse name: N/A   • Number of children: N/A   • Years of education: N/A     Occupational History   • Not on file.     Social History Main Topics   • Smoking status: Current Every Day Smoker     Types: Cigarettes, Electronic Cigarette   • Smokeless tobacco: Never Used      Comment: Interested in quitting smoking; Amount: 1-9 cigs/day  using the E cigarette. She is smoking 6-8 per day   • Alcohol use No   • Drug use: No   • Sexual activity: Defer      Comment: Marital status: Single     Other Topics Concern   • Not on file     Social History Narrative       Family History   Problem Relation Age of Onset   • Diabetes Mother    • Heart disease Mother    • Hypertension Mother    • Clotting disorder Mother      \"blood clots in lungs and leg after surgery\"   • Lung cancer Father    • Heart disease Sister    • Hypertension Sister    • Heart disease Brother    • Hypertension Brother    • Breast cancer Neg Hx    • Colon cancer Neg Hx      Colorectal cancer   • Endometrial cancer Neg Hx    • Ovarian cancer Neg Hx          Current Outpatient Prescriptions:   •  albuterol (PROVENTIL) (2.5 MG/3ML) " 0.083% nebulizer solution, Take 2.5 mg by nebulization 4 (Four) Times a Day. Use 1 Vial, Disp: 120 mL, Rfl: 5  •  amitriptyline (ELAVIL) 150 MG tablet, Take 150 mg by mouth., Disp: , Rfl:   •  aspirin 81 MG chewable tablet, Chew 81 mg daily., Disp: , Rfl:   •  atorvastatin (LIPITOR) 40 MG tablet, Take 1 tablet by mouth Daily., Disp: 30 tablet, Rfl: 5  •  benzonatate (TESSALON) 100 MG capsule, , Disp: , Rfl:   •  budesonide-formoterol (SYMBICORT) 160-4.5 MCG/ACT inhaler, Inhale 1 puff 2 (Two) Times a Day., Disp: 6 g, Rfl: 5  •  buPROPion SR (WELLBUTRIN SR) 150 MG 12 hr tablet, Take 1 tablet by mouth., Disp: , Rfl:   •  Calcium Carbonate-Vit D-Min (CALTRATE PLUS PO), Take 1 tablet by mouth 2 (two) times a day., Disp: , Rfl:   •  cetirizine (zyrTEC) 10 MG tablet, Take 1 tablet by mouth Daily., Disp: 30 tablet, Rfl: 5  •  Cholecalciferol 5000 UNITS tablet, Take 5,000 Units by mouth Daily., Disp: 30 tablet, Rfl: 5  •  clonazePAM (KlonoPIN) 1 MG tablet, , Disp: , Rfl:   •  diltiazem La (CARDIZEM LA) 420 MG 24 hr tablet, Take 1 tablet by mouth Daily. PER DR. MARTINEZ; Med Name: diltiazem  mg tablet,extended release 24 hr, Disp: 30 tablet, Rfl: 5  •  Docusate Calcium (STOOL SOFTENER PO), Take 1 tablet by mouth as needed., Disp: , Rfl:   •  fluticasone (FLONASE) 50 MCG/ACT nasal spray, 1-2 sprays into each nostril Daily., Disp: 1 each, Rfl: 5  •  fluticasone-salmeterol (ADVAIR DISKUS) 250-50 MCG/DOSE DISKUS, Inhale 1 puff Every 12 (Twelve) Hours., Disp: 60 each, Rfl: 5  •  furosemide (LASIX) 20 MG tablet, Take 1 tablet by mouth Daily., Disp: 30 tablet, Rfl: 5  •  ipratropium (ATROVENT HFA) 17 MCG/ACT inhaler, Inhale 2 puffs 4 (Four) Times a Day., Disp: 1 inhaler, Rfl: 5  •  nitroglycerin (NITROSTAT) 0.4 MG SL tablet, Place 0.4 mg under the tongue Every 5 (Five) Minutes., Disp: , Rfl:   •  ondansetron (ZOFRAN) 4 MG tablet, Take 4 mg by mouth Every 8 (Eight) Hours As Needed for nausea or vomiting., Disp: , Rfl:   •   "oxyCODONE-acetaminophen (PERCOCET)  MG per tablet, Take 1 tablet by mouth 4 (Four) Times a Day., Disp: , Rfl:   •  PARoxetine (PAXIL) 20 MG tablet, Take 1 tablet by mouth Every Morning., Disp: 30 tablet, Rfl: 5  •  raNITIdine (ZANTAC) 300 MG tablet, Take 1 tablet by mouth 2 (Two) Times a Day., Disp: 60 tablet, Rfl: 5  •  valACYclovir (VALTREX) 500 MG tablet, Take 1 tablet by mouth 2 (Two) Times a Day., Disp: 60 tablet, Rfl: 1  •  vitamin D (ERGOCALCIFEROL) 08898 UNITS capsule capsule, Take 1 capsule by mouth Every 14 (Fourteen) Days., Disp: 2 capsule, Rfl: 5  •  CHANTIX STARTING MONTH ZAK 0.5 MG X 11 & 1 MG X 42 tablet, , Disp: , Rfl: 0    Allergies   Allergen Reactions   • Ciprofloxacin    • Ditropan Xl [Oxybutynin] Other (See Comments)     Other reaction(s): YEAST   • Gabapentin    • Ibuprofen Nausea And Vomiting   • Naprosyn [Naproxen] Nausea And Vomiting   • Olanzapine    • Phenergan [Promethazine] Hallucinations   • Plavix [Clopidogrel]    • Prednisone Hives   • Prilosec [Omeprazole] Nausea And Vomiting   • Tylenol [Acetaminophen] Nausea And Vomiting     Tylenol-Codeine #3   • Valium [Diazepam] Other (See Comments)     Other reaction(s): \"winds me up\"   • Carafate [Sucralfate] Rash   • Celebrex [Celecoxib] Rash   • Cephalexin Rash   • Doxycycline Monohydrate [Doxycycline] Rash and Other (See Comments)     Other reaction(s): SICK   • Lorazepam Itching and Palpitations   • Other      Cipro:  Rash  Darvocet-N 100:  N/V   • Relafen [Nabumetone] Nausea And Vomiting and Rash       Review of Systems   Musculoskeletal: Positive for back pain (lower).        Bilateral hip pain   All other systems reviewed and are negative.    All systems reviewed and negative except for above.    Physical Exam   Constitutional: She is oriented to person, place, and time. She appears well-developed and well-nourished. No distress.   Cardiovascular: Normal rate.    Pulmonary/Chest: Effort normal.   Musculoskeletal:        Lumbar " back: She exhibits decreased range of motion (limited flex and ext while seated. facet loading noted. ), tenderness ( midline ttp) and pain.   Neurological: She is alert and oriented to person, place, and time. She displays no tremor. She displays no seizure activity. Gait ( Walker) abnormal. Coordination normal.   Walker for ambulation   Skin: Skin is warm and dry.   Psychiatric: She has a normal mood and affect. Her behavior is normal. Judgment normal. She expresses no homicidal and no suicidal ideation.   Slow speech   Nursing note and vitals reviewed.      Hollie was seen today for back pain and hip pain.    Diagnoses and all orders for this visit:    DDD (degenerative disc disease), lumbar    Lumbar radiculopathy    High risk medications (not anticoagulants) long-term use    Impaired mobility    Smoking addiction        Medication: Patient reports no negative side effects, Patient reports appropriate usage and storage habits and Patient's opioid provides enough relief to be more active and perform activities of daily living with less discomfort. Percocet 10mg qid. Discussed case with Dave Her, opiate medication refilled ×2 hand written scripts. Pt remains weaning self off Benzo- no SE. I have discussed the CDC recommendations of Benzo (Klonopin) and Opiate ( Norco).         Interventional: Pt remains seeing PSYCH. MOOSE and any neurological impairments discussed with patient that may need of emergency evaluation. Smoking cessation discussion was given, but I am unsure of the compliance.         Rehab: Pt with home stretching.     Behavioral: No aberrant behavior noted. [facility] Report # [id number]  was reviewed and is consistent with stated history    Urine drug screen Reviewed from last visit and is appropriate          This document has been electronically signed by ETIENNE Beard on December 15, 2017 12:10 PM          This document has been electronically signed by ETIENNE Beard on December  15, 2017 12:10 PM

## 2018-01-05 RX ORDER — RANITIDINE 300 MG/1
300 TABLET ORAL 2 TIMES DAILY
Qty: 60 TABLET | Refills: 0 | OUTPATIENT
Start: 2018-01-05

## 2018-01-06 RX ORDER — RANITIDINE 300 MG/1
TABLET ORAL
Qty: 60 TABLET | Refills: 5 | Status: CANCELLED | OUTPATIENT
Start: 2018-01-06

## 2018-01-08 RX ORDER — RANITIDINE 300 MG/1
300 TABLET ORAL NIGHTLY
Qty: 60 TABLET | Refills: 5 | Status: SHIPPED | OUTPATIENT
Start: 2018-01-08 | End: 2018-01-15 | Stop reason: SDUPTHER

## 2018-01-15 RX ORDER — RANITIDINE 300 MG/1
300 TABLET ORAL
Qty: 60 TABLET | Refills: 5 | Status: SHIPPED | OUTPATIENT
Start: 2018-01-15

## 2018-02-26 ENCOUNTER — TRANSCRIBE ORDERS (OUTPATIENT)
Dept: GENERAL RADIOLOGY | Facility: CLINIC | Age: 58
End: 2018-02-26

## 2018-02-26 DIAGNOSIS — Z12.2 ENCOUNTER FOR SCREENING FOR LUNG CANCER: Primary | ICD-10-CM

## 2018-03-15 ENCOUNTER — OFFICE VISIT (OUTPATIENT)
Dept: FAMILY MEDICINE CLINIC | Facility: CLINIC | Age: 58
End: 2018-03-15

## 2018-03-15 VITALS
DIASTOLIC BLOOD PRESSURE: 68 MMHG | HEIGHT: 61 IN | BODY MASS INDEX: 23.79 KG/M2 | WEIGHT: 126 LBS | OXYGEN SATURATION: 98 % | SYSTOLIC BLOOD PRESSURE: 112 MMHG | HEART RATE: 112 BPM

## 2018-03-15 DIAGNOSIS — I10 ESSENTIAL HYPERTENSION: ICD-10-CM

## 2018-03-15 DIAGNOSIS — M54.32 SCIATICA OF LEFT SIDE: Primary | ICD-10-CM

## 2018-03-15 PROCEDURE — 99213 OFFICE O/P EST LOW 20 MIN: CPT | Performed by: INTERNAL MEDICINE

## 2018-03-15 RX ORDER — MECLIZINE HYDROCHLORIDE 25 MG/1
25 TABLET ORAL 3 TIMES DAILY PRN
COMMUNITY
End: 2018-06-12

## 2018-05-06 RX ORDER — ATORVASTATIN CALCIUM 40 MG/1
40 TABLET, FILM COATED ORAL DAILY
Qty: 30 TABLET | Refills: 0 | Status: SHIPPED | OUTPATIENT
Start: 2018-05-06 | End: 2018-10-20 | Stop reason: SDUPTHER

## 2018-05-06 RX ORDER — DILTIAZEM HYDROCHLORIDE 420 MG/1
420 CAPSULE, EXTENDED RELEASE ORAL DAILY
Qty: 30 CAPSULE | Refills: 0 | Status: SHIPPED | OUTPATIENT
Start: 2018-05-06 | End: 2018-10-01 | Stop reason: SDUPTHER

## 2018-06-01 ENCOUNTER — OFFICE VISIT (OUTPATIENT)
Dept: OTOLARYNGOLOGY | Facility: CLINIC | Age: 58
End: 2018-06-01

## 2018-06-01 VITALS — HEIGHT: 61 IN | WEIGHT: 111 LBS | OXYGEN SATURATION: 97 % | BODY MASS INDEX: 20.96 KG/M2

## 2018-06-01 DIAGNOSIS — H61.001 CHONDRODERMATITIS NODULARIS HELICIS OF RIGHT EAR: Primary | ICD-10-CM

## 2018-06-01 PROCEDURE — 99214 OFFICE O/P EST MOD 30 MIN: CPT | Performed by: OTOLARYNGOLOGY

## 2018-06-01 RX ORDER — BUDESONIDE AND FORMOTEROL FUMARATE DIHYDRATE 160; 4.5 UG/1; UG/1
1 AEROSOL RESPIRATORY (INHALATION)
COMMUNITY
End: 2018-06-12

## 2018-06-01 RX ORDER — BENZONATATE 200 MG/1
200 CAPSULE ORAL 3 TIMES DAILY PRN
COMMUNITY

## 2018-06-01 NOTE — PROGRESS NOTES
"Subjective   Hollie Pedro is a 58 y.o. female.     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  Previously underwent resection of multiple lesions of the left ear, and more recently, in 2016, underwent resection of another lesion of the right ear.  Returns today stating she has had painful lesions of her right ear for the last 2 months.  She has tried topical steroids without improvement.  She says these feel like all of the other lesion she's had.  Not bleeding but are quite painful.      The following portions of the patient's history were reviewed and updated as appropriate: allergies, current medications, past family history, past medical history, past social history, past surgical history and problem list.      Hollie Pedro reports that she has been smoking Cigarettes and Electronic Cigarette.  She has never used smokeless tobacco. She reports that she does not drink alcohol or use drugs.  Patient is a tobacco user and has been counseled for use of tobacco products    Family History   Problem Relation Age of Onset   • Diabetes Mother    • Heart disease Mother    • Hypertension Mother    • Clotting disorder Mother         \"blood clots in lungs and leg after surgery\"   • Lung cancer Father    • Heart disease Sister    • Hypertension Sister    • Heart disease Brother    • Hypertension Brother    • Breast cancer Neg Hx    • Colon cancer Neg Hx         Colorectal cancer   • Endometrial cancer Neg Hx    • Ovarian cancer Neg Hx        Allergies   Allergen Reactions   • Ciprofloxacin    • Ditropan Xl [Oxybutynin] Other (See Comments)     Other reaction(s): YEAST   • Gabapentin    • Ibuprofen Nausea And Vomiting   • Naprosyn [Naproxen] Nausea And Vomiting   • Olanzapine    • Phenergan [Promethazine] Hallucinations   • Plavix [Clopidogrel]    • Prednisone Hives   • Prilosec [Omeprazole] Nausea And Vomiting   • Tylenol " "[Acetaminophen] Nausea And Vomiting     Tylenol-Codeine #3   • Valium [Diazepam] Other (See Comments)     Other reaction(s): \"winds me up\"   • Carafate [Sucralfate] Rash   • Celebrex [Celecoxib] Rash   • Cephalexin Rash   • Doxycycline Monohydrate [Doxycycline] Rash and Other (See Comments)     Other reaction(s): SICK   • Lorazepam Itching and Palpitations   • Other      Cipro:  Rash  Darvocet-N 100:  N/V   • Relafen [Nabumetone] Nausea And Vomiting and Rash         Current Outpatient Prescriptions:   •  albuterol (PROVENTIL) (2.5 MG/3ML) 0.083% nebulizer solution, Take 2.5 mg by nebulization 4 (Four) Times a Day. Use 1 Vial, Disp: 120 mL, Rfl: 5  •  amitriptyline (ELAVIL) 150 MG tablet, Take 150 mg by mouth., Disp: , Rfl:   •  aspirin 81 MG chewable tablet, Chew 81 mg daily., Disp: , Rfl:   •  atorvastatin (LIPITOR) 40 MG tablet, Take 1 tablet by mouth Daily. Labs and appt with new pcp needed for refills, Disp: 30 tablet, Rfl: 0  •  benzonatate (TESSALON) 200 MG capsule, Take 200 mg by mouth 3 (Three) Times a Day As Needed for Cough., Disp: , Rfl:   •  budesonide-formoterol (SYMBICORT) 160-4.5 MCG/ACT inhaler, Inhale 2 puffs 2 (Two) Times a Day., Disp: , Rfl:   •  buPROPion SR (WELLBUTRIN SR) 150 MG 12 hr tablet, Take 1 tablet by mouth., Disp: , Rfl:   •  cetirizine (zyrTEC) 10 MG tablet, Take 1 tablet by mouth Daily., Disp: 30 tablet, Rfl: 5  •  Cholecalciferol 5000 units tablet, Take 5,000 Units by mouth Daily., Disp: 30 tablet, Rfl: 5  •  clonazePAM (KlonoPIN) 1 MG tablet, , Disp: , Rfl:   •  diltiaZEM (TIAZAC) 420 MG 24 hr capsule, Take 1 capsule by mouth Daily. PER DR. MARTINEZ; appt & labs with new pcp for refills, Disp: 30 capsule, Rfl: 0  •  Docusate Calcium (STOOL SOFTENER PO), Take 1 tablet by mouth as needed., Disp: , Rfl:   •  fluticasone (FLONASE) 50 MCG/ACT nasal spray, 1-2 sprays into each nostril Daily., Disp: 1 each, Rfl: 5  •  fluticasone-salmeterol (ADVAIR DISKUS) 250-50 MCG/DOSE DISKUS, Inhale 1 " "puff Every 12 (Twelve) Hours., Disp: 60 each, Rfl: 5  •  furosemide (LASIX) 20 MG tablet, Take 1 tablet by mouth Daily., Disp: 30 tablet, Rfl: 5  •  ipratropium (ATROVENT HFA) 17 MCG/ACT inhaler, Inhale 2 puffs 4 (Four) Times a Day., Disp: 1 inhaler, Rfl: 5  •  meclizine (ANTIVERT) 25 MG tablet, Take 25 mg by mouth 3 (Three) Times a Day As Needed for dizziness., Disp: , Rfl:   •  nitroglycerin (NITROSTAT) 0.4 MG SL tablet, Place 0.4 mg under the tongue Every 5 (Five) Minutes., Disp: , Rfl:   •  ondansetron (ZOFRAN) 4 MG tablet, Take 4 mg by mouth Every 8 (Eight) Hours As Needed for nausea or vomiting., Disp: , Rfl:   •  oxyCODONE-acetaminophen (PERCOCET)  MG per tablet, Take 1 tablet by mouth 4 (Four) Times a Day., Disp: , Rfl:   •  PARoxetine (PAXIL) 20 MG tablet, Take 1 tablet by mouth Every Morning., Disp: 30 tablet, Rfl: 5  •  raNITIdine (ZANTAC) 300 MG tablet, Take 1 tablet by mouth 2 (Two) Times a Day., Disp: 60 tablet, Rfl: 5  •  valACYclovir (VALTREX) 500 MG tablet, Take 1 tablet by mouth 2 (Two) Times a Day., Disp: 60 tablet, Rfl: 1  •  vitamin D (ERGOCALCIFEROL) 04574 UNITS capsule capsule, Take 1 capsule by mouth Every 14 (Fourteen) Days., Disp: 2 capsule, Rfl: 5    Past Medical History:   Diagnosis Date   • Acute bronchitis    • Adenomatous polyp of colon    • Arthropathy of lumbar facet joint    • Asthma    • Benign essential hypertension    • Chronic obstructive lung disease    • Chronic pain    • Degeneration of lumbar intervertebral disc    • Degenerative joint disease involving multiple joints    • Depressive disorder    • Drug therapy     Long-term drug therapy   • Drug therapy     Other long term (current) drug therapy   • Emphysema/COPD     \"Emphysema\"   • Encounter for gynecological examination     Gynecologic examination   • Encounter for gynecological examination without abnormal finding     Encounter for gynecological examination (general) (routine) without abnormal findings   • " Encounter for screening for malignant neoplasm of colon     Screening for malignant neoplasm of colon   • Epigastric pain    • Gastroesophageal reflux disease    • Generalized anxiety disorder    • History of bone density study 10/17/2014    DEXA BONE DENSITY 51545 ([facility]) (2) - SAMANTHA MAGANA ([facility])    • History of diagnostic mammography 04/28/2016    DIAG MAMM, UNILAT RT DIG  (Medicare) ([facility]) (1) - ZAYRA JENKINS ([facility])    • History of mammogram     Other screening mammogram   • History of mammogram 10/29/2015    MAMMOGRAM UNILATERAL RT 76432 ([facility]) (1) - SAMANTHA MAGANA ([facility])    • History of mammogram 10/26/2015    SCREENING MAMMOGRAM 21273 ([facility]) (4) - SAMANTHA MAGANA ([facility])    • Hyperlipidemia    • Hypertensive disorder    • Left flank pain    • Lumbosacral radiculitis    • Menopause     Menopause - controlled on prempro   • Mild recurrent major depression    • Myofascial pain    • Osteoarthritis    • Osteopenia    • Osteoporosis    • Pain in lower limb    • Paresthesia    • Pleurisy    • Prolapse of vaginal walls without uterine prolapse    • Proximal muscle weakness    • Rash     C/O: a rash - legs, arms, and abdomen.   • Smoker    • Syncope    • Transient cerebral ischemia    • Unexplained weight loss     Unexplained weight loss - No obvious GI reason   • Weakness of face muscles          Review of Systems   Constitutional: Negative for fever.   HENT: Positive for ear pain.    Musculoskeletal: Positive for back pain.           Objective   Physical Exam  General: Thin, chronically ill-appearing female.  Shuffling gait.  Alert and oriented ×3.  Head normocephalic.  Voice: Strong.  Speech: Fluent.  Ears: Left external ear shows evidence of previous resection of the majority of the antihelix.  No worrisome lesions.  Canal shows no discharge.  Tympanic membrane intact and clear.  Right external ear shows 6 mm erythematous and exquisitely painful lesions of the right medial antihelix 1 each of the superior and inferior luis of  the medial antihelix.  Also has a 7 mm nodular lesion at the junction of the anti-helix to the anti-tragus.  All of these are consistent with chondrodermatitis nodularis helicis.  Right ear canal shows no discharge.  Tympanic membrane intact and clear.  Nose: Nares show no discharge mass polyp or purulence.  Boggy mucosa is present.  No gross external deformity.    Oral cavity: Lips and gums without lesions.  Tongue and floor of mouth without lesions.  Parotid and submandibular ducts unobstructed.  No mucosal lesions on the buccal mucosa or vestibule of the mouth.  Pharynx: No erythema exudate mass or ulcer  Neck: No lymphadenopathy.  No thyromegaly.  Trachea and larynx midline.  No masses in the parotid or submandibular glands.  Chest: Occasional wheeze but no rales or rhonchi.  Heart: Regular.  Abdomen: Benign.      Assessment/Plan   Hollie was seen today for ear problem.    Diagnoses and all orders for this visit:    Chondrodermatitis nodularis helicis of right ear        Plan: Offered excision of these 3 lesions for both diagnostic and therapeutic purposes.  I explained the nature of the procedure to the patient in layman's terms.  Explained risk of bleeding, infection, poor healing, poor appearance, and possibility of recurrence, as well as possible need for further treatment depending on final pathology.  Proposed benefit would be definitive diagnosis and hopefully definitive treatment.  The alternative would be observation.  Patient voices understanding of all the above and wishes to proceed with surgery but insists that this must be done under general anesthesia and she does not think she can tolerate having this done in an office setting.  Will schedule for surgery and proceed as long as anesthesia is agreeable.

## 2018-06-03 RX ORDER — ATORVASTATIN CALCIUM 40 MG/1
40 TABLET, FILM COATED ORAL DAILY
Qty: 30 TABLET | Refills: 0 | OUTPATIENT
Start: 2018-06-03

## 2018-06-03 NOTE — TELEPHONE ENCOUNTER
No more refills for this pt, she has been told to schedule labs and appt and given one mtn refill, was not compliant, will not refill medications

## 2018-06-04 ENCOUNTER — PREP FOR SURGERY (OUTPATIENT)
Dept: OTHER | Facility: HOSPITAL | Age: 58
End: 2018-06-04

## 2018-06-07 RX ORDER — ATORVASTATIN CALCIUM 40 MG/1
40 TABLET, FILM COATED ORAL DAILY
Qty: 30 TABLET | Refills: 0 | OUTPATIENT
Start: 2018-06-07

## 2018-06-12 ENCOUNTER — APPOINTMENT (OUTPATIENT)
Dept: PREADMISSION TESTING | Facility: HOSPITAL | Age: 58
End: 2018-06-12

## 2018-06-12 VITALS
HEIGHT: 61 IN | RESPIRATION RATE: 18 BRPM | DIASTOLIC BLOOD PRESSURE: 80 MMHG | SYSTOLIC BLOOD PRESSURE: 150 MMHG | OXYGEN SATURATION: 98 % | WEIGHT: 111 LBS | BODY MASS INDEX: 20.96 KG/M2 | HEART RATE: 97 BPM

## 2018-06-12 LAB
ANION GAP SERPL CALCULATED.3IONS-SCNC: 6 MMOL/L (ref 5–15)
BUN BLD-MCNC: 9 MG/DL (ref 7–21)
BUN/CREAT SERPL: 11.7 (ref 7–25)
CALCIUM SPEC-SCNC: 10 MG/DL (ref 8.4–10.2)
CHLORIDE SERPL-SCNC: 100 MMOL/L (ref 95–110)
CO2 SERPL-SCNC: 32 MMOL/L (ref 22–31)
CREAT BLD-MCNC: 0.77 MG/DL (ref 0.5–1)
GFR SERPL CREATININE-BSD FRML MDRD: 77 ML/MIN/1.73 (ref 51–120)
GLUCOSE BLD-MCNC: 89 MG/DL (ref 60–100)
POTASSIUM BLD-SCNC: 3.5 MMOL/L (ref 3.5–5.1)
SODIUM BLD-SCNC: 138 MMOL/L (ref 137–145)

## 2018-06-12 PROCEDURE — 93005 ELECTROCARDIOGRAM TRACING: CPT

## 2018-06-12 PROCEDURE — 93010 ELECTROCARDIOGRAM REPORT: CPT | Performed by: INTERNAL MEDICINE

## 2018-06-12 PROCEDURE — 36415 COLL VENOUS BLD VENIPUNCTURE: CPT

## 2018-06-12 PROCEDURE — 80048 BASIC METABOLIC PNL TOTAL CA: CPT | Performed by: ANESTHESIOLOGY

## 2018-06-12 RX ORDER — SODIUM CHLORIDE, SODIUM GLUCONATE, SODIUM ACETATE, POTASSIUM CHLORIDE, AND MAGNESIUM CHLORIDE 526; 502; 368; 37; 30 MG/100ML; MG/100ML; MG/100ML; MG/100ML; MG/100ML
1000 INJECTION, SOLUTION INTRAVENOUS CONTINUOUS
Status: CANCELLED | OUTPATIENT
Start: 2018-06-18

## 2018-06-12 RX ORDER — ALBUTEROL SULFATE 1.25 MG/3ML
1 SOLUTION RESPIRATORY (INHALATION) EVERY 6 HOURS PRN
COMMUNITY

## 2018-06-12 RX ORDER — PAROXETINE HYDROCHLORIDE 20 MG/1
20 TABLET, FILM COATED ORAL NIGHTLY
COMMUNITY

## 2018-06-12 RX ORDER — FLUTICASONE PROPIONATE 50 MCG
2 SPRAY, SUSPENSION (ML) NASAL 2 TIMES DAILY
COMMUNITY

## 2018-06-16 ENCOUNTER — ANESTHESIA EVENT (OUTPATIENT)
Dept: PERIOP | Facility: HOSPITAL | Age: 58
End: 2018-06-16

## 2018-06-18 ENCOUNTER — ANESTHESIA (OUTPATIENT)
Dept: PERIOP | Facility: HOSPITAL | Age: 58
End: 2018-06-18

## 2018-06-18 ENCOUNTER — HOSPITAL ENCOUNTER (OUTPATIENT)
Facility: HOSPITAL | Age: 58
Setting detail: HOSPITAL OUTPATIENT SURGERY
Discharge: HOME | End: 2018-06-18
Attending: OTOLARYNGOLOGY | Admitting: OTOLARYNGOLOGY

## 2018-06-18 VITALS
RESPIRATION RATE: 20 BRPM | SYSTOLIC BLOOD PRESSURE: 121 MMHG | HEIGHT: 61 IN | HEART RATE: 78 BPM | OXYGEN SATURATION: 93 % | TEMPERATURE: 97.8 F | BODY MASS INDEX: 20.98 KG/M2 | WEIGHT: 111.11 LBS | DIASTOLIC BLOOD PRESSURE: 58 MMHG

## 2018-06-18 DIAGNOSIS — H61.001 CHONDRODERMATITIS NODULARIS HELICIS OF RIGHT EAR: ICD-10-CM

## 2018-06-18 PROCEDURE — 88305 TISSUE EXAM BY PATHOLOGIST: CPT | Performed by: PATHOLOGY

## 2018-06-18 PROCEDURE — 25010000002 HYDROMORPHONE PER 4 MG: Performed by: NURSE ANESTHETIST, CERTIFIED REGISTERED

## 2018-06-18 PROCEDURE — 25010000002 FENTANYL CITRATE (PF) 100 MCG/2ML SOLUTION: Performed by: NURSE ANESTHETIST, CERTIFIED REGISTERED

## 2018-06-18 PROCEDURE — 25010000002 MIDAZOLAM PER 1 MG: Performed by: NURSE ANESTHETIST, CERTIFIED REGISTERED

## 2018-06-18 PROCEDURE — 11441 EXC FACE-MM B9+MARG 0.6-1 CM: CPT | Performed by: OTOLARYNGOLOGY

## 2018-06-18 PROCEDURE — 25010000002 DEXAMETHASONE PER 1 MG: Performed by: NURSE ANESTHETIST, CERTIFIED REGISTERED

## 2018-06-18 PROCEDURE — 88305 TISSUE EXAM BY PATHOLOGIST: CPT | Performed by: OTOLARYNGOLOGY

## 2018-06-18 PROCEDURE — 25010000002 MORPHINE PER 10 MG: Performed by: ANESTHESIOLOGY

## 2018-06-18 PROCEDURE — 25010000002 PROPOFOL 10 MG/ML EMULSION: Performed by: NURSE ANESTHETIST, CERTIFIED REGISTERED

## 2018-06-18 PROCEDURE — 25010000002 ONDANSETRON PER 1 MG: Performed by: NURSE ANESTHETIST, CERTIFIED REGISTERED

## 2018-06-18 RX ORDER — LABETALOL HYDROCHLORIDE 5 MG/ML
5 INJECTION, SOLUTION INTRAVENOUS
Status: DISCONTINUED | OUTPATIENT
Start: 2018-06-18 | End: 2018-06-18 | Stop reason: HOSPADM

## 2018-06-18 RX ORDER — MIDAZOLAM HYDROCHLORIDE 1 MG/ML
INJECTION INTRAMUSCULAR; INTRAVENOUS AS NEEDED
Status: DISCONTINUED | OUTPATIENT
Start: 2018-06-18 | End: 2018-06-18 | Stop reason: SURG

## 2018-06-18 RX ORDER — EPHEDRINE SULFATE 50 MG/ML
5 INJECTION, SOLUTION INTRAVENOUS ONCE AS NEEDED
Status: DISCONTINUED | OUTPATIENT
Start: 2018-06-18 | End: 2018-06-18 | Stop reason: HOSPADM

## 2018-06-18 RX ORDER — DIPHENHYDRAMINE HYDROCHLORIDE 50 MG/ML
12.5 INJECTION INTRAMUSCULAR; INTRAVENOUS
Status: DISCONTINUED | OUTPATIENT
Start: 2018-06-18 | End: 2018-06-18 | Stop reason: HOSPADM

## 2018-06-18 RX ORDER — SODIUM CHLORIDE, SODIUM GLUCONATE, SODIUM ACETATE, POTASSIUM CHLORIDE, AND MAGNESIUM CHLORIDE 526; 502; 368; 37; 30 MG/100ML; MG/100ML; MG/100ML; MG/100ML; MG/100ML
1000 INJECTION, SOLUTION INTRAVENOUS CONTINUOUS
Status: DISCONTINUED | OUTPATIENT
Start: 2018-06-18 | End: 2018-06-18 | Stop reason: HOSPADM

## 2018-06-18 RX ORDER — DEXAMETHASONE SODIUM PHOSPHATE 4 MG/ML
INJECTION, SOLUTION INTRA-ARTICULAR; INTRALESIONAL; INTRAMUSCULAR; INTRAVENOUS; SOFT TISSUE AS NEEDED
Status: DISCONTINUED | OUTPATIENT
Start: 2018-06-18 | End: 2018-06-18 | Stop reason: SURG

## 2018-06-18 RX ORDER — ONDANSETRON 2 MG/ML
INJECTION INTRAMUSCULAR; INTRAVENOUS AS NEEDED
Status: DISCONTINUED | OUTPATIENT
Start: 2018-06-18 | End: 2018-06-18 | Stop reason: SURG

## 2018-06-18 RX ORDER — LIDOCAINE HYDROCHLORIDE AND EPINEPHRINE 10; 10 MG/ML; UG/ML
INJECTION, SOLUTION INFILTRATION; PERINEURAL AS NEEDED
Status: DISCONTINUED | OUTPATIENT
Start: 2018-06-18 | End: 2018-06-18 | Stop reason: HOSPADM

## 2018-06-18 RX ORDER — ACETAMINOPHEN 650 MG/1
650 SUPPOSITORY RECTAL ONCE AS NEEDED
Status: DISCONTINUED | OUTPATIENT
Start: 2018-06-18 | End: 2018-06-18 | Stop reason: HOSPADM

## 2018-06-18 RX ORDER — BACTERIOSTATIC SODIUM CHLORIDE 0.9 %
VIAL (ML) INJECTION AS NEEDED
Status: DISCONTINUED | OUTPATIENT
Start: 2018-06-18 | End: 2018-06-18 | Stop reason: HOSPADM

## 2018-06-18 RX ORDER — NALOXONE HCL 0.4 MG/ML
0.2 VIAL (ML) INJECTION AS NEEDED
Status: DISCONTINUED | OUTPATIENT
Start: 2018-06-18 | End: 2018-06-18 | Stop reason: HOSPADM

## 2018-06-18 RX ORDER — HYDROCODONE BITARTRATE AND ACETAMINOPHEN 5; 325 MG/1; MG/1
2 TABLET ORAL ONCE AS NEEDED
Status: DISCONTINUED | OUTPATIENT
Start: 2018-06-18 | End: 2018-06-18 | Stop reason: HOSPADM

## 2018-06-18 RX ORDER — MEPERIDINE HYDROCHLORIDE 50 MG/ML
12.5 INJECTION INTRAMUSCULAR; INTRAVENOUS; SUBCUTANEOUS
Status: DISCONTINUED | OUTPATIENT
Start: 2018-06-18 | End: 2018-06-18 | Stop reason: HOSPADM

## 2018-06-18 RX ORDER — EPHEDRINE SULFATE 50 MG/ML
INJECTION, SOLUTION INTRAVENOUS AS NEEDED
Status: DISCONTINUED | OUTPATIENT
Start: 2018-06-18 | End: 2018-06-18 | Stop reason: SURG

## 2018-06-18 RX ORDER — BACITRACIN 50000 [IU]/1
INJECTION, POWDER, FOR SOLUTION INTRAMUSCULAR AS NEEDED
Status: DISCONTINUED | OUTPATIENT
Start: 2018-06-18 | End: 2018-06-18 | Stop reason: HOSPADM

## 2018-06-18 RX ORDER — PROPOFOL 10 MG/ML
VIAL (ML) INTRAVENOUS AS NEEDED
Status: DISCONTINUED | OUTPATIENT
Start: 2018-06-18 | End: 2018-06-18 | Stop reason: SURG

## 2018-06-18 RX ORDER — LIDOCAINE HYDROCHLORIDE 20 MG/ML
INJECTION, SOLUTION INFILTRATION; PERINEURAL AS NEEDED
Status: DISCONTINUED | OUTPATIENT
Start: 2018-06-18 | End: 2018-06-18 | Stop reason: SURG

## 2018-06-18 RX ORDER — ACETAMINOPHEN 325 MG/1
650 TABLET ORAL ONCE AS NEEDED
Status: DISCONTINUED | OUTPATIENT
Start: 2018-06-18 | End: 2018-06-18 | Stop reason: HOSPADM

## 2018-06-18 RX ORDER — OXYCODONE HYDROCHLORIDE AND ACETAMINOPHEN 5; 325 MG/1; MG/1
2 TABLET ORAL ONCE
Status: COMPLETED | OUTPATIENT
Start: 2018-06-18 | End: 2018-06-18

## 2018-06-18 RX ORDER — FENTANYL CITRATE 50 UG/ML
INJECTION, SOLUTION INTRAMUSCULAR; INTRAVENOUS AS NEEDED
Status: DISCONTINUED | OUTPATIENT
Start: 2018-06-18 | End: 2018-06-18 | Stop reason: SURG

## 2018-06-18 RX ORDER — ONDANSETRON 2 MG/ML
4 INJECTION INTRAMUSCULAR; INTRAVENOUS ONCE AS NEEDED
Status: DISCONTINUED | OUTPATIENT
Start: 2018-06-18 | End: 2018-06-18 | Stop reason: HOSPADM

## 2018-06-18 RX ADMIN — EPHEDRINE SULFATE 10 MG: 50 INJECTION INTRAVENOUS at 12:00

## 2018-06-18 RX ADMIN — ONDANSETRON 4 MG: 2 INJECTION INTRAMUSCULAR; INTRAVENOUS at 11:52

## 2018-06-18 RX ADMIN — LIDOCAINE HYDROCHLORIDE 50 MG: 20 INJECTION, SOLUTION INFILTRATION; PERINEURAL at 11:52

## 2018-06-18 RX ADMIN — EPHEDRINE SULFATE 10 MG: 50 INJECTION INTRAVENOUS at 12:04

## 2018-06-18 RX ADMIN — SODIUM CHLORIDE, SODIUM GLUCONATE, SODIUM ACETATE, POTASSIUM CHLORIDE, AND MAGNESIUM CHLORIDE 1000 ML: 526; 502; 368; 37; 30 INJECTION, SOLUTION INTRAVENOUS at 09:33

## 2018-06-18 RX ADMIN — FENTANYL CITRATE 50 MCG: 50 INJECTION, SOLUTION INTRAMUSCULAR; INTRAVENOUS at 11:52

## 2018-06-18 RX ADMIN — MORPHINE SULFATE 2 MG: 4 INJECTION INTRAVENOUS at 09:50

## 2018-06-18 RX ADMIN — MIDAZOLAM 2 MG: 1 INJECTION INTRAMUSCULAR; INTRAVENOUS at 11:46

## 2018-06-18 RX ADMIN — PROPOFOL 150 MG: 10 INJECTION, EMULSION INTRAVENOUS at 11:53

## 2018-06-18 RX ADMIN — PROPOFOL 20 MG: 10 INJECTION, EMULSION INTRAVENOUS at 12:05

## 2018-06-18 RX ADMIN — DEXAMETHASONE SODIUM PHOSPHATE 4 MG: 4 INJECTION, SOLUTION INTRAMUSCULAR; INTRAVENOUS at 11:52

## 2018-06-18 RX ADMIN — HYDROMORPHONE HYDROCHLORIDE 0.5 MG: 1 INJECTION, SOLUTION INTRAMUSCULAR; INTRAVENOUS; SUBCUTANEOUS at 12:50

## 2018-06-18 RX ADMIN — OXYCODONE HYDROCHLORIDE AND ACETAMINOPHEN 2 TABLET: 5; 325 TABLET ORAL at 13:19

## 2018-06-18 NOTE — ANESTHESIA PREPROCEDURE EVALUATION
Anesthesia Evaluation     Patient summary reviewed and Nursing notes reviewed   NPO Solid Status: > 8 hours  NPO Liquid Status: > 8 hours           Airway   Mallampati: II  TM distance: >3 FB  Neck ROM: full  possible difficult intubation  Dental    (+) poor dentation    Comment: Chipped left upper incisor with remaining dentition in poor repair with grounded surfaces. Cautioned about possible dental injury.    Pulmonary - normal exam   (+) a smoker Current Smoked day of surgery, COPD moderate, asthma, decreased breath sounds,     ROS comment: Patient used inhalers and nebulizer this morning.  Cardiovascular - normal exam    ECG reviewed  Rhythm: regular  Rate: normal    (+) hypertension, hyperlipidemia,   (-) murmur, carotid bruits    ROS comment: Normal sinus rhythm  Possible Left atrial enlargement  Left ventricular hypertrophy with repolarization abnormality  Abnormal ECG  When compared with ECG of 30-NOV-2016 14:22,  Vent. rate has increased BY  37 BPM  Confirmed by MOHIT GALVEZ, NERISSA (61),  WILLIAM BENITEZ (5) on  6/14/2018 7:02:31 AM    Neuro/Psych  (+) TIA, syncope, numbness (Lumbrosacral radiculopathy.), psychiatric history Anxiety and Depression,     GI/Hepatic/Renal/Endo    (+)  GERD well controlled,      Musculoskeletal     Abdominal    Substance History - negative use     OB/GYN negative ob/gyn ROS         Other   (+) arthritis                     Anesthesia Plan    ASA 3     general     intravenous induction   Anesthetic plan and risks discussed with patient, spouse/significant other and sibling.

## 2018-06-18 NOTE — H&P (VIEW-ONLY)
"Subjective   Hollie Pedro is a 58 y.o. female.     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  Previously underwent resection of multiple lesions of the left ear, and more recently, in 2016, underwent resection of another lesion of the right ear.  Returns today stating she has had painful lesions of her right ear for the last 2 months.  She has tried topical steroids without improvement.  She says these feel like all of the other lesion she's had.  Not bleeding but are quite painful.      The following portions of the patient's history were reviewed and updated as appropriate: allergies, current medications, past family history, past medical history, past social history, past surgical history and problem list.      Hollie Pedro reports that she has been smoking Cigarettes and Electronic Cigarette.  She has never used smokeless tobacco. She reports that she does not drink alcohol or use drugs.  Patient is a tobacco user and has been counseled for use of tobacco products    Family History   Problem Relation Age of Onset   • Diabetes Mother    • Heart disease Mother    • Hypertension Mother    • Clotting disorder Mother         \"blood clots in lungs and leg after surgery\"   • Lung cancer Father    • Heart disease Sister    • Hypertension Sister    • Heart disease Brother    • Hypertension Brother    • Breast cancer Neg Hx    • Colon cancer Neg Hx         Colorectal cancer   • Endometrial cancer Neg Hx    • Ovarian cancer Neg Hx        Allergies   Allergen Reactions   • Ciprofloxacin    • Ditropan Xl [Oxybutynin] Other (See Comments)     Other reaction(s): YEAST   • Gabapentin    • Ibuprofen Nausea And Vomiting   • Naprosyn [Naproxen] Nausea And Vomiting   • Olanzapine    • Phenergan [Promethazine] Hallucinations   • Plavix [Clopidogrel]    • Prednisone Hives   • Prilosec [Omeprazole] Nausea And Vomiting   • Tylenol " "[Acetaminophen] Nausea And Vomiting     Tylenol-Codeine #3   • Valium [Diazepam] Other (See Comments)     Other reaction(s): \"winds me up\"   • Carafate [Sucralfate] Rash   • Celebrex [Celecoxib] Rash   • Cephalexin Rash   • Doxycycline Monohydrate [Doxycycline] Rash and Other (See Comments)     Other reaction(s): SICK   • Lorazepam Itching and Palpitations   • Other      Cipro:  Rash  Darvocet-N 100:  N/V   • Relafen [Nabumetone] Nausea And Vomiting and Rash         Current Outpatient Prescriptions:   •  albuterol (PROVENTIL) (2.5 MG/3ML) 0.083% nebulizer solution, Take 2.5 mg by nebulization 4 (Four) Times a Day. Use 1 Vial, Disp: 120 mL, Rfl: 5  •  amitriptyline (ELAVIL) 150 MG tablet, Take 150 mg by mouth., Disp: , Rfl:   •  aspirin 81 MG chewable tablet, Chew 81 mg daily., Disp: , Rfl:   •  atorvastatin (LIPITOR) 40 MG tablet, Take 1 tablet by mouth Daily. Labs and appt with new pcp needed for refills, Disp: 30 tablet, Rfl: 0  •  benzonatate (TESSALON) 200 MG capsule, Take 200 mg by mouth 3 (Three) Times a Day As Needed for Cough., Disp: , Rfl:   •  budesonide-formoterol (SYMBICORT) 160-4.5 MCG/ACT inhaler, Inhale 2 puffs 2 (Two) Times a Day., Disp: , Rfl:   •  buPROPion SR (WELLBUTRIN SR) 150 MG 12 hr tablet, Take 1 tablet by mouth., Disp: , Rfl:   •  cetirizine (zyrTEC) 10 MG tablet, Take 1 tablet by mouth Daily., Disp: 30 tablet, Rfl: 5  •  Cholecalciferol 5000 units tablet, Take 5,000 Units by mouth Daily., Disp: 30 tablet, Rfl: 5  •  clonazePAM (KlonoPIN) 1 MG tablet, , Disp: , Rfl:   •  diltiaZEM (TIAZAC) 420 MG 24 hr capsule, Take 1 capsule by mouth Daily. PER DR. MARTINEZ; appt & labs with new pcp for refills, Disp: 30 capsule, Rfl: 0  •  Docusate Calcium (STOOL SOFTENER PO), Take 1 tablet by mouth as needed., Disp: , Rfl:   •  fluticasone (FLONASE) 50 MCG/ACT nasal spray, 1-2 sprays into each nostril Daily., Disp: 1 each, Rfl: 5  •  fluticasone-salmeterol (ADVAIR DISKUS) 250-50 MCG/DOSE DISKUS, Inhale 1 " "puff Every 12 (Twelve) Hours., Disp: 60 each, Rfl: 5  •  furosemide (LASIX) 20 MG tablet, Take 1 tablet by mouth Daily., Disp: 30 tablet, Rfl: 5  •  ipratropium (ATROVENT HFA) 17 MCG/ACT inhaler, Inhale 2 puffs 4 (Four) Times a Day., Disp: 1 inhaler, Rfl: 5  •  meclizine (ANTIVERT) 25 MG tablet, Take 25 mg by mouth 3 (Three) Times a Day As Needed for dizziness., Disp: , Rfl:   •  nitroglycerin (NITROSTAT) 0.4 MG SL tablet, Place 0.4 mg under the tongue Every 5 (Five) Minutes., Disp: , Rfl:   •  ondansetron (ZOFRAN) 4 MG tablet, Take 4 mg by mouth Every 8 (Eight) Hours As Needed for nausea or vomiting., Disp: , Rfl:   •  oxyCODONE-acetaminophen (PERCOCET)  MG per tablet, Take 1 tablet by mouth 4 (Four) Times a Day., Disp: , Rfl:   •  PARoxetine (PAXIL) 20 MG tablet, Take 1 tablet by mouth Every Morning., Disp: 30 tablet, Rfl: 5  •  raNITIdine (ZANTAC) 300 MG tablet, Take 1 tablet by mouth 2 (Two) Times a Day., Disp: 60 tablet, Rfl: 5  •  valACYclovir (VALTREX) 500 MG tablet, Take 1 tablet by mouth 2 (Two) Times a Day., Disp: 60 tablet, Rfl: 1  •  vitamin D (ERGOCALCIFEROL) 34145 UNITS capsule capsule, Take 1 capsule by mouth Every 14 (Fourteen) Days., Disp: 2 capsule, Rfl: 5    Past Medical History:   Diagnosis Date   • Acute bronchitis    • Adenomatous polyp of colon    • Arthropathy of lumbar facet joint    • Asthma    • Benign essential hypertension    • Chronic obstructive lung disease    • Chronic pain    • Degeneration of lumbar intervertebral disc    • Degenerative joint disease involving multiple joints    • Depressive disorder    • Drug therapy     Long-term drug therapy   • Drug therapy     Other long term (current) drug therapy   • Emphysema/COPD     \"Emphysema\"   • Encounter for gynecological examination     Gynecologic examination   • Encounter for gynecological examination without abnormal finding     Encounter for gynecological examination (general) (routine) without abnormal findings   • " Encounter for screening for malignant neoplasm of colon     Screening for malignant neoplasm of colon   • Epigastric pain    • Gastroesophageal reflux disease    • Generalized anxiety disorder    • History of bone density study 10/17/2014    DEXA BONE DENSITY 80890 ([facility]) (2) - SAMANTHA MAGANA ([facility])    • History of diagnostic mammography 04/28/2016    DIAG MAMM, UNILAT RT DIG  (Medicare) ([facility]) (1) - ZAYRA JENKINS ([facility])    • History of mammogram     Other screening mammogram   • History of mammogram 10/29/2015    MAMMOGRAM UNILATERAL RT 96511 ([facility]) (1) - SAMANTHA MAGANA ([facility])    • History of mammogram 10/26/2015    SCREENING MAMMOGRAM 04319 ([facility]) (4) - SAMANTHA MAGANA ([facility])    • Hyperlipidemia    • Hypertensive disorder    • Left flank pain    • Lumbosacral radiculitis    • Menopause     Menopause - controlled on prempro   • Mild recurrent major depression    • Myofascial pain    • Osteoarthritis    • Osteopenia    • Osteoporosis    • Pain in lower limb    • Paresthesia    • Pleurisy    • Prolapse of vaginal walls without uterine prolapse    • Proximal muscle weakness    • Rash     C/O: a rash - legs, arms, and abdomen.   • Smoker    • Syncope    • Transient cerebral ischemia    • Unexplained weight loss     Unexplained weight loss - No obvious GI reason   • Weakness of face muscles          Review of Systems   Constitutional: Negative for fever.   HENT: Positive for ear pain.    Musculoskeletal: Positive for back pain.           Objective   Physical Exam  General: Thin, chronically ill-appearing female.  Shuffling gait.  Alert and oriented ×3.  Head normocephalic.  Voice: Strong.  Speech: Fluent.  Ears: Left external ear shows evidence of previous resection of the majority of the antihelix.  No worrisome lesions.  Canal shows no discharge.  Tympanic membrane intact and clear.  Right external ear shows 6 mm erythematous and exquisitely painful lesions of the right medial antihelix 1 each of the superior and inferior luis of  the medial antihelix.  Also has a 7 mm nodular lesion at the junction of the anti-helix to the anti-tragus.  All of these are consistent with chondrodermatitis nodularis helicis.  Right ear canal shows no discharge.  Tympanic membrane intact and clear.  Nose: Nares show no discharge mass polyp or purulence.  Boggy mucosa is present.  No gross external deformity.    Oral cavity: Lips and gums without lesions.  Tongue and floor of mouth without lesions.  Parotid and submandibular ducts unobstructed.  No mucosal lesions on the buccal mucosa or vestibule of the mouth.  Pharynx: No erythema exudate mass or ulcer  Neck: No lymphadenopathy.  No thyromegaly.  Trachea and larynx midline.  No masses in the parotid or submandibular glands.  Chest: Occasional wheeze but no rales or rhonchi.  Heart: Regular.  Abdomen: Benign.      Assessment/Plan   Hollie was seen today for ear problem.    Diagnoses and all orders for this visit:    Chondrodermatitis nodularis helicis of right ear        Plan: Offered excision of these 3 lesions for both diagnostic and therapeutic purposes.  I explained the nature of the procedure to the patient in layman's terms.  Explained risk of bleeding, infection, poor healing, poor appearance, and possibility of recurrence, as well as possible need for further treatment depending on final pathology.  Proposed benefit would be definitive diagnosis and hopefully definitive treatment.  The alternative would be observation.  Patient voices understanding of all the above and wishes to proceed with surgery but insists that this must be done under general anesthesia and she does not think she can tolerate having this done in an office setting.  Will schedule for surgery and proceed as long as anesthesia is agreeable.

## 2018-06-18 NOTE — ANESTHESIA PROCEDURE NOTES
Airway  Urgency: elective    Airway not difficult    General Information and Staff    Patient location during procedure: OR    Indications and Patient Condition  Indications for airway management: airway protection    Preoxygenated: yes  MILS maintained throughout  Mask difficulty assessment: 0 - not attempted    Final Airway Details  Final airway type: supraglottic airway      Successful airway: I-gel  Size 3    Number of attempts at approach: 1

## 2018-06-18 NOTE — BRIEF OP NOTE
SKIN LESION EXCISION  Progress Note    Hollie Pedro  6/18/2018    Pre-op Diagnosis:   Chondrodermatitis nodularis helicis of right ear [H61.001]       Post-Op Diagnosis Codes:     * Chondrodermatitis nodularis helicis of right ear [H61.001]    Procedure/CPT® Codes:      Procedure(s):  EXCISION OF SKIN LESIONS RIGHT EAR    Surgeon(s):  Stephen Schilling MD    Anesthesia: General    Staff:   Circulator: Neda Bernard RN; Jenny George RN  Scrub Person: Sol Johnson; Florence Weaver  Assistant: Michelle Stokes    Estimated Blood Loss: minimal    Urine Voided: * No values recorded between 6/18/2018 11:46 AM and 6/18/2018 12:29 PM *    Specimens:                ID Type Source Tests Collected by Time   A : LESION RIGHT UPPER EAR  Tissue Ear, Right TISSUE PATHOLOGY EXAM Stephen Schilling MD 6/18/2018 1218   B : LESION  RIGHT LOWER EAR  Tissue Ear, Right TISSUE PATHOLOGY EXAM Stephen Schilling MD 6/18/2018 1224         Drains:      Findings: Ulcerated lesion involving the superior aspect of the antihelix; nodular lesion involving the inferior aspect of the antihelix/anti-tragus    Complications: None      Stephen Schilling MD     Date: 6/18/2018  Time: 12:31 PM

## 2018-06-18 NOTE — ANESTHESIA POSTPROCEDURE EVALUATION
Patient: Hollie Pedro    Procedure Summary     Date:  06/18/18 Room / Location:  [facility] OR 08 / [facility] OR    Anesthesia Start:  1148 Anesthesia Stop:  1237    Procedure:  EXCISION OF SKIN LESIONS RIGHT EAR (Right ) Diagnosis:       Chondrodermatitis nodularis helicis of right ear      (Chondrodermatitis nodularis helicis of right ear [H61.001])    Surgeon:  Stephen Schilling MD Provider:  Shin Gallegos MD    Anesthesia Type:  general ASA Status:  3          Anesthesia Type: general  Last vitals  BP   144/73 (06/18/18 0927)   Temp   99.4 °F (37.4 °C) (06/18/18 0927)   Pulse   89 (06/18/18 0927)   Resp   22 (06/18/18 0927)     SpO2   93 % (06/18/18 0927)     Post Anesthesia Care and Evaluation    Patient location during evaluation: PACU  Patient participation: complete - patient participated  Level of consciousness: awake and alert  Pain management: adequate  Airway patency: patent  Anesthetic complications: No anesthetic complications    Cardiovascular status: acceptable  Respiratory status: acceptable  Hydration status: acceptable

## 2018-06-18 NOTE — OP NOTE
PREOPERATIVE DIAGNOSIS: Chondrodermatitis nodularis helicis, right ear, x2.    POSTOPERATIVE DIAGNOSIS: Chondrodermatitis nodularis helicis, right ear, x2.    PROCEDURE PERFORMED: Excision of skin lesions, right ear, x2.  (Upper lesion 0.8 cm, lower lesion 0.7 cm)    SURGEON: Stephen Schilling MD    ANESTHESIA: General via laryngeal mask airway.    ESTIMATED BLOOD LOSS: Minimal.    FLUIDS: Crystalloid.    SPECIMENS: Right upper ear lesion, right lower ear lesion.    COMPLICATIONS: None.    INDICATIONS FOR PROCEDURE: A 58-year-old female who has already had multiple areas of chondrodermatitis nodularis helicis resected in the past presented to the office with painful, ulcerative lesions of the right upper antihelix and a nodular, painful lesion of the right inferior antihelix/antitragus.    DESCRIPTION OF PROCEDURE: Patient was taken to the operating room and placed in supine position. After the satisfactory induction of general anesthesia via laryngeal mask airway, the right ear was cleansed with alcohol and the skin around the 2 identified lesions was infiltrated with 1% Xylocaine with epinephrine. The ear was prepped and draped sterilely. The upper antihelix lesion was excised sharply around all grossly abnormal tissue and down to and including the underlying cartilage. This was sent to pathology for permanent section. The wound was irrigated with saline to which Bacitracin had been added. Additional small fragments of cartilage were trimmed to facilitate closure, and the wound was closed with a single running 5-0 nylon suture. Attention was turned to the inferior lesion, which was also excised sharply around the entire gross lesion. Incision was extended inferiorly to remove the prominence of the antitragus which would have been highly likely to develop a new area of chondrodermatitis if left alone. This was sent to pathology for permanent section. Small pieces of additional cartilage were resected using scissors to  facilitate a smooth closure. The wound was irrigated with saline to which Bacitracin had been added, and the wound was closed with a single running 5-0 nylon suture. Bacitracin ointment was applied. Procedure was terminated. Patient tolerated procedure well, went to recovery room in satisfactory condition.

## 2018-06-19 ENCOUNTER — TELEPHONE (OUTPATIENT)
Dept: OTOLARYNGOLOGY | Facility: CLINIC | Age: 58
End: 2018-06-19

## 2018-06-19 LAB
LAB AP CASE REPORT: NORMAL
PATH REPORT.FINAL DX SPEC: NORMAL
PATH REPORT.GROSS SPEC: NORMAL

## 2018-06-19 NOTE — TELEPHONE ENCOUNTER
Message relayed  ----- Message from Stephen Schilling MD sent at 6/19/2018 10:08 AM CDT -----  Contact: [phone]  Tell her to keep her head elevated, apply ice, cover it with the bandage until it quits dripping.  She is in a pain program so I cannot give her any extra pain medicine, but the ice and elevation should help.  ----- Message -----  From: Timothy Solomon  Sent: 6/19/2018   9:52 AM  To: Stephen Schilling MD    Ms. Pedro called to say that she had surgery 06/18/18 on her ear; her ear is bleeding and she is in a lot of pain. Wants to know what she should do.     Hollie Pedro [phone]

## 2018-06-26 ENCOUNTER — OFFICE VISIT (OUTPATIENT)
Dept: OTOLARYNGOLOGY | Facility: CLINIC | Age: 58
End: 2018-06-26

## 2018-06-26 VITALS — WEIGHT: 111 LBS | BODY MASS INDEX: 20.96 KG/M2 | OXYGEN SATURATION: 98 % | HEIGHT: 61 IN

## 2018-06-26 DIAGNOSIS — Z48.817 AFTERCARE FOLLOWING SURGERY OF THE SKIN OR SUBCUTANEOUS TISSUE: Primary | ICD-10-CM

## 2018-06-26 PROCEDURE — 99024 POSTOP FOLLOW-UP VISIT: CPT | Performed by: OTOLARYNGOLOGY

## 2018-06-26 NOTE — PROGRESS NOTES
Subjective   Hollie Pedro is a 58 y.o. female.       History of Present Illness   Patient is status post excision of benign skin lesions of the right ear.  The upper lesion was consistent with chondrodermatitis nodularis helicis chronicus.  The lower lesion was an epidermal cyst on final pathology.  Patient initially had some bleeding after surgery that resolved with conservative treatment she complains of pain but this is a chronic complaint and her ears are not hurting anymore than her baseline.      The following portions of the patient's history were reviewed and updated as appropriate: allergies, current medications, past family history, past medical history, past social history, past surgical history and problem list.     reports that she has been smoking Cigarettes and Electronic Cigarette.  She has never used smokeless tobacco. She reports that she does not drink alcohol or use drugs.   Patient is a tobacco user and has been counseled for use of tobacco products      Review of Systems        Objective   Physical Exam  Right ear shows both incisions intact without evidence of infection.  Sutures are trimmed and removed under the microscope.      Assessment/Plan   Hollie was seen today for post-op.    Diagnoses and all orders for this visit:    Aftercare following surgery of the skin or subcutaneous tissue      Plan: With benign pathology for both lesions no further treatment needed from my standpoint.  May follow up with me as needed.

## 2018-10-01 RX ORDER — DILTIAZEM HYDROCHLORIDE 420 MG/1
420 CAPSULE, EXTENDED RELEASE ORAL DAILY
Qty: 14 CAPSULE | Refills: 0 | Status: SHIPPED | OUTPATIENT
Start: 2018-10-01

## 2018-10-21 RX ORDER — ATORVASTATIN CALCIUM 40 MG/1
40 TABLET, FILM COATED ORAL DAILY
Qty: 7 TABLET | Refills: 0 | Status: SHIPPED | OUTPATIENT
Start: 2018-10-21

## 2018-10-23 ENCOUNTER — TRANSCRIBE ORDERS (OUTPATIENT)
Dept: GENERAL RADIOLOGY | Facility: CLINIC | Age: 58
End: 2018-10-23

## 2018-10-23 DIAGNOSIS — M81.8 OTHER OSTEOPOROSIS WITHOUT CURRENT PATHOLOGICAL FRACTURE: ICD-10-CM

## 2018-10-23 DIAGNOSIS — Z12.31 ENCOUNTER FOR SCREENING MAMMOGRAM FOR MALIGNANT NEOPLASM OF BREAST: Primary | ICD-10-CM

## 2018-11-21 RX ORDER — DILTIAZEM HYDROCHLORIDE 420 MG/1
420 CAPSULE, EXTENDED RELEASE ORAL DAILY
Qty: 14 CAPSULE | Refills: 0 | OUTPATIENT
Start: 2018-11-21

## 2018-11-28 ENCOUNTER — TRANSCRIBE ORDERS (OUTPATIENT)
Dept: LAB | Facility: OTHER | Age: 58
End: 2018-11-28

## 2018-11-28 ENCOUNTER — LAB (OUTPATIENT)
Dept: LAB | Facility: OTHER | Age: 58
End: 2018-11-28

## 2018-11-28 DIAGNOSIS — E78.00 HYPERCHOLESTEREMIA: ICD-10-CM

## 2018-11-28 DIAGNOSIS — I65.23 BILATERAL CAROTID ARTERY OCCLUSION: ICD-10-CM

## 2018-11-28 DIAGNOSIS — I34.0 NON-RHEUMATIC MITRAL REGURGITATION: ICD-10-CM

## 2018-11-28 DIAGNOSIS — R23.3 BRUISING TENDENCY: ICD-10-CM

## 2018-11-28 DIAGNOSIS — I20.1 PRINZMETAL ANGINA (HCC): ICD-10-CM

## 2018-11-28 DIAGNOSIS — I20.1 PRINZMETAL ANGINA (HCC): Primary | ICD-10-CM

## 2018-11-28 DIAGNOSIS — R06.02 SHORTNESS OF BREATH: ICD-10-CM

## 2018-11-28 LAB
ALBUMIN SERPL-MCNC: 4.7 G/DL (ref 3.5–5)
ALBUMIN/GLOB SERPL: 1.7 G/DL (ref 1.1–1.8)
ALP SERPL-CCNC: 139 U/L (ref 38–126)
ALT SERPL W P-5'-P-CCNC: 18 U/L
ANION GAP SERPL CALCULATED.3IONS-SCNC: 5 MMOL/L (ref 5–15)
APTT PPP: 29.2 SECONDS (ref 20–40.3)
AST SERPL-CCNC: 28 U/L (ref 14–36)
BASOPHILS # BLD AUTO: 0.05 10*3/MM3 (ref 0–0.2)
BASOPHILS NFR BLD AUTO: 0.7 % (ref 0–2)
BILIRUB SERPL-MCNC: 0.5 MG/DL (ref 0.2–1.3)
BUN BLD-MCNC: 16 MG/DL (ref 7–17)
BUN/CREAT SERPL: 18.8 (ref 7–25)
CALCIUM SPEC-SCNC: 9.9 MG/DL (ref 8.4–10.2)
CHLORIDE SERPL-SCNC: 106 MMOL/L (ref 98–107)
CHOLEST SERPL-MCNC: 210 MG/DL (ref 150–200)
CO2 SERPL-SCNC: 30 MMOL/L (ref 22–30)
CREAT BLD-MCNC: 0.85 MG/DL (ref 0.52–1.04)
DEPRECATED RDW RBC AUTO: 51.8 FL (ref 36.4–46.3)
EOSINOPHIL # BLD AUTO: 0.14 10*3/MM3 (ref 0–0.7)
EOSINOPHIL NFR BLD AUTO: 2 % (ref 0–7)
ERYTHROCYTE [DISTWIDTH] IN BLOOD BY AUTOMATED COUNT: 16.1 % (ref 11.5–14.5)
GFR SERPL CREATININE-BSD FRML MDRD: 68 ML/MIN/1.73 (ref 51–120)
GLOBULIN UR ELPH-MCNC: 2.7 GM/DL (ref 2.3–3.5)
GLUCOSE BLD-MCNC: 108 MG/DL (ref 74–99)
HCT VFR BLD AUTO: 42.5 % (ref 35–45)
HDLC SERPL-MCNC: 61 MG/DL (ref 40–59)
HGB BLD-MCNC: 13.9 G/DL (ref 12–15.5)
INR PPP: 0.94 (ref 0.8–1.2)
IRON 24H UR-MRATE: 110 MCG/DL (ref 37–170)
IRON SATN MFR SERPL: 25 % (ref 15–50)
LDLC SERPL CALC-MCNC: 125 MG/DL
LDLC/HDLC SERPL: 2.05 {RATIO} (ref 0–3.22)
LYMPHOCYTES # BLD AUTO: 2.86 10*3/MM3 (ref 0.6–4.2)
LYMPHOCYTES NFR BLD AUTO: 40.5 % (ref 10–50)
MCH RBC QN AUTO: 29.3 PG (ref 26.5–34)
MCHC RBC AUTO-ENTMCNC: 32.7 G/DL (ref 31.4–36)
MCV RBC AUTO: 89.7 FL (ref 80–98)
MONOCYTES # BLD AUTO: 0.66 10*3/MM3 (ref 0–0.9)
MONOCYTES NFR BLD AUTO: 9.3 % (ref 0–12)
NEUTROPHILS # BLD AUTO: 3.36 10*3/MM3 (ref 2–8.6)
NEUTROPHILS NFR BLD AUTO: 47.5 % (ref 37–80)
PLATELET # BLD AUTO: 406 10*3/MM3 (ref 150–450)
PMV BLD AUTO: 9.5 FL (ref 8–12)
POTASSIUM BLD-SCNC: 4 MMOL/L (ref 3.4–5)
PROT SERPL-MCNC: 7.4 G/DL (ref 6.3–8.2)
PROTHROMBIN TIME: 12.4 SECONDS (ref 11.1–15.3)
RBC # BLD AUTO: 4.74 10*6/MM3 (ref 3.77–5.16)
SODIUM BLD-SCNC: 141 MMOL/L (ref 137–145)
TIBC SERPL-MCNC: 439 MCG/DL (ref 265–497)
TRIGL SERPL-MCNC: 121 MG/DL
VLDLC SERPL-MCNC: 24.2 MG/DL
WBC NRBC COR # BLD: 7.07 10*3/MM3 (ref 3.2–9.8)

## 2018-11-28 PROCEDURE — 83550 IRON BINDING TEST: CPT | Performed by: INTERNAL MEDICINE

## 2018-11-28 PROCEDURE — 85730 THROMBOPLASTIN TIME PARTIAL: CPT | Performed by: INTERNAL MEDICINE

## 2018-11-28 PROCEDURE — 85025 COMPLETE CBC W/AUTO DIFF WBC: CPT | Performed by: INTERNAL MEDICINE

## 2018-11-28 PROCEDURE — 83540 ASSAY OF IRON: CPT | Performed by: INTERNAL MEDICINE

## 2018-11-28 PROCEDURE — 85610 PROTHROMBIN TIME: CPT | Performed by: INTERNAL MEDICINE

## 2018-11-28 PROCEDURE — 80053 COMPREHEN METABOLIC PANEL: CPT | Performed by: INTERNAL MEDICINE

## 2018-11-28 PROCEDURE — 80061 LIPID PANEL: CPT | Performed by: INTERNAL MEDICINE

## 2019-10-04 ENCOUNTER — OFFICE VISIT (OUTPATIENT)
Dept: OTOLARYNGOLOGY | Facility: CLINIC | Age: 59
End: 2019-10-04

## 2019-10-04 VITALS — HEIGHT: 61 IN | BODY MASS INDEX: 20.58 KG/M2 | WEIGHT: 109 LBS

## 2019-10-04 DIAGNOSIS — H61.002 CHONDRODERMATITIS NODULARIS HELICIS OF LEFT EAR: Primary | ICD-10-CM

## 2019-10-04 PROCEDURE — 99214 OFFICE O/P EST MOD 30 MIN: CPT | Performed by: OTOLARYNGOLOGY

## 2019-10-04 RX ORDER — PANTOPRAZOLE SODIUM 20 MG/1
20 TABLET, DELAYED RELEASE ORAL DAILY
Refills: 3 | COMMUNITY
Start: 2019-09-20

## 2019-10-04 RX ORDER — POTASSIUM CHLORIDE 20 MEQ/1
20 TABLET, EXTENDED RELEASE ORAL
COMMUNITY
Start: 2019-06-03 | End: 2020-06-03

## 2019-10-04 RX ORDER — QUETIAPINE FUMARATE 50 MG/1
50 TABLET, FILM COATED ORAL
COMMUNITY

## 2019-10-04 RX ORDER — NITROGLYCERIN 0.4 MG/1
0.4 TABLET SUBLINGUAL
COMMUNITY
Start: 2016-11-04

## 2019-10-04 RX ORDER — TRIAMCINOLONE ACETONIDE 5 MG/G
OINTMENT TOPICAL
Refills: 1 | COMMUNITY
Start: 2019-09-21

## 2019-10-04 RX ORDER — ALENDRONATE SODIUM 70 MG/1
TABLET ORAL
Refills: 11 | COMMUNITY
Start: 2019-09-20

## 2019-10-07 NOTE — PROGRESS NOTES
Subjective   Hollie Pedro is a 59 y.o. female.       History of Present Illness   Patient is known to me with a history of benign skin lesions of the ears, most of which have been chondrodermatitis nodularis helicis chronicus.  These been resected on several previous occasions.  She returns today stating she has a new painful lesion on the left that is been present for several weeks.  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  This is reportedly in remission and she is taking maintenance Keytruda.  She has been using triamcinolone cream on the left ear lesion but it is still quite painful.      The following portions of the patient's history were reviewed and updated as appropriate: allergies, current medications, past family history, past medical history, past social history, past surgical history and problem list.     reports that she has been smoking cigarettes and electronic cigarette.  She has never used smokeless tobacco. She reports that she does not drink alcohol or use drugs.   Patient is a tobacco user and has been counseled for use of tobacco products      Review of Systems   Constitutional: Negative for fever.   HENT: Positive for ear pain.            Objective   Physical Exam  General: Thin, chronically ill-appearing female in no acute distress.  Ears: Evidence of multiple previous resections including a significant portion of the antihelix of both ears.  The current lesion in question is a 1.0 cm area of erythema and scaling that involves essentially the entire remaining anterior remnant of the left antihelix.  No other lesions are noted.  Canal show no discharge.  Tympanic membranes intact  Nose: Boggy mucosa, no discharge, mass, polyp, purulence  Oral cavity: Lips and gums without lesions.  Tongue and floor of mouth without lesions.  Parotid and submandibular ducts unobstructed.  No mucosal lesions on the  buccal mucosa or vestibule of the mouth.  Pharynx: No erythema, exudate, mass  Neck: No lymphadenopathy.  No thyromegaly.  Trachea and larynx midline.  No masses in the parotid or submandibular glands.      Assessment/Plan   Hollie was seen today for skin lesion.    Diagnoses and all orders for this visit:    Chondrodermatitis nodularis helicis of left ear      Plan: I told the patient I would be willing to consider surgical resection of this lesion, which she is familiar with, but in the past she has always requested general anesthesia and I told her that with her current diagnosis of metastatic lung cancer and ongoing treatment with maintenance chemotherapy, I do not believe the benefits of general anesthesia for this benign condition outweigh the risk.  I told her if she wanted me to resect this I would resected in the office using local anesthetic.  I explained the nature of this procedure to her including risks of bleeding, infection, poor healing, poor appearance, recurrence, and possible need for further treatment depending on final pathology proposed benefit would be definitive diagnosis and hopefully definitive treatment.  The alternative would be observation.  Patient voices understanding and agrees to proceed in the near future.

## 2019-10-30 ENCOUNTER — TRANSCRIBE ORDERS (OUTPATIENT)
Dept: LAB | Facility: OTHER | Age: 59
End: 2019-10-30

## 2019-10-30 DIAGNOSIS — Z12.31 ENCOUNTER FOR SCREENING MAMMOGRAM FOR MALIGNANT NEOPLASM OF BREAST: Primary | ICD-10-CM

## 2019-11-01 DIAGNOSIS — Z12.31 ENCOUNTER FOR SCREENING MAMMOGRAM FOR MALIGNANT NEOPLASM OF BREAST: Primary | ICD-10-CM

## 2019-11-06 ENCOUNTER — PROCEDURE VISIT (OUTPATIENT)
Dept: OTOLARYNGOLOGY | Facility: CLINIC | Age: 59
End: 2019-11-06

## 2019-11-06 VITALS — WEIGHT: 109 LBS | BODY MASS INDEX: 20.58 KG/M2 | RESPIRATION RATE: 20 BRPM | HEIGHT: 61 IN

## 2019-11-06 DIAGNOSIS — H61.002 CHONDRODERMATITIS NODULARIS HELICIS OF LEFT EAR: Primary | ICD-10-CM

## 2019-11-06 PROCEDURE — 11442 EXC FACE-MM B9+MARG 1.1-2 CM: CPT | Performed by: OTOLARYNGOLOGY

## 2019-11-06 PROCEDURE — 88305 TISSUE EXAM BY PATHOLOGIST: CPT | Performed by: OTOLARYNGOLOGY

## 2019-11-06 PROCEDURE — 88305 TISSUE EXAM BY PATHOLOGIST: CPT | Performed by: PATHOLOGY

## 2019-11-06 RX ORDER — GINSENG 100 MG
CAPSULE ORAL
Qty: 14 G | Refills: 1 | Status: SHIPPED | OUTPATIENT
Start: 2019-11-06

## 2019-11-08 LAB
LAB AP CASE REPORT: NORMAL
PATH REPORT.FINAL DX SPEC: NORMAL
PATH REPORT.GROSS SPEC: NORMAL

## 2019-11-11 NOTE — PROGRESS NOTES
Procedure note    Preop diagnosis: Recurrent chondrodermatitis nodularis helicis left ear    Postop diagnosis: Same    Procedure: Excision of lesion left ear 1.5 cm with single layer closure    Surgeon: Dr. Schilling    Anesthesia: 1% Xylocaine with epinephrine    Description of procedure: After once again explained the nature of the procedure to the patient including risks of bleeding, infection, poor healing, poor appearance, and recurrence of inflammation versus the benefit of definitive diagnosis and definitive treatment and the alternative of observation informed consent was confirmed.  The area of concern of the left antihelix was identified and the skin was cleansed with alcohol and infiltrated with 1% Xylocaine with epinephrine and prepped and draped sterilely.  The lesion was excised sharply full-thickness including the underlying cartilage and sent to pathology for permanent section.  Wound edges were mobilized and additional cartilage was resected to facilitate closure.  Wound was irrigated thoroughly with saline and closed with a single layer of running 5-0 nylon suture.  Straighten ointment was applied and procedure was terminated.  Patient tolerated procedure well.  She was instructed in local wound care and advised to return to the [place] office on 11/15/2019 for suture removal.

## 2019-11-15 ENCOUNTER — OFFICE VISIT (OUTPATIENT)
Dept: OTOLARYNGOLOGY | Facility: CLINIC | Age: 59
End: 2019-11-15

## 2019-11-15 VITALS — HEIGHT: 61 IN | OXYGEN SATURATION: 98 % | BODY MASS INDEX: 20.58 KG/M2 | WEIGHT: 109 LBS

## 2019-11-15 DIAGNOSIS — Z48.817 AFTERCARE FOLLOWING SURGERY OF THE SKIN OR SUBCUTANEOUS TISSUE: Primary | ICD-10-CM

## 2019-11-15 PROCEDURE — 99024 POSTOP FOLLOW-UP VISIT: CPT | Performed by: OTOLARYNGOLOGY

## 2019-11-15 NOTE — PROGRESS NOTES
"Subjective   Hollie Pedro is a 59 y.o. female.       History of Present Illness   Patient is 9 days status post excision of an area of chondrodermatitis nodularis helicis of the left ear.  She says it is still somewhat painful.  She has been applying bacitracin as directed and also at nighttime she is been using triple antibiotic ointment with \"pain\".      The following portions of the patient's history were reviewed and updated as appropriate: allergies, current medications, past family history, past medical history, past social history, past surgical history and problem list.     reports that she has been smoking cigarettes and electronic cigarette.  She has never used smokeless tobacco. She reports that she does not drink alcohol or use drugs.   Patient is a tobacco user and has been counseled for use of tobacco products      Review of Systems        Objective   Physical Exam  Left ear incision is intact with no evidence of infection.  All external sutures are removed.      Assessment/Plan   Hollie was seen today for follow-up.    Diagnoses and all orders for this visit:    Aftercare following surgery of the skin or subcutaneous tissue      Plan: Reassured patient that the final pathology was benign.  Sutures removed as described above.  May follow-up with me as needed.      "

## (undated) DEVICE — NDL HYPO ECLPS SFTY 25G 1 1/2IN

## (undated) DEVICE — MARKR SKIN W/RULR AND LBL

## (undated) DEVICE — CONTAINER,SPECIMEN,OR STERILE,4OZ: Brand: MEDLINE

## (undated) DEVICE — SUT ETHLN 5/0 P3 18IN 698H

## (undated) DEVICE — PK ENT LF 60

## (undated) DEVICE — PAD GRND REM POLYHESIVE A/ DISP

## (undated) DEVICE — GLV SURG TRIUMPH LT PF LTX 8 STRL

## (undated) DEVICE — GLV SURG SENSICARE GREEN W/ALOE PF LF 6.5 STRL

## (undated) DEVICE — ELECTRD NDL MEGADYNE EZCLEAN MEGAFINE 2IN

## (undated) DEVICE — SOL IRR NACL 0.9PCT BT 1000ML

## (undated) DEVICE — ELECTRD NDL OLSEN MOD TIP W/2MM EXPOSURE 1P/U

## (undated) DEVICE — SPNG GZ WOVN 4X4IN 12PLY 10/BX STRL

## (undated) DEVICE — GLV SURG TRIUMPH ORTHO W/ALOE PF LTX 6.5 STRL

## (undated) DEVICE — SYR LL TP 10ML STRL

## (undated) DEVICE — PREP PVP-I 7.5P BT 4OZ

## (undated) DEVICE — PREP SOL POVIDONE/IODINE BT 4OZ

## (undated) DEVICE — GLV SURG SENSICARE GREEN W/ALOE PF LF 6 STRL

## (undated) DEVICE — GOWN,AURORA,NOREINF,RAGLAN,XL,STERILE: Brand: MEDLINE

## (undated) DEVICE — TRY IRR

## (undated) DEVICE — PENCL E/S HNDSWCH ROCKR CB

## (undated) DEVICE — ADAPT CANCER LUER STUB 18G